# Patient Record
Sex: FEMALE | Race: WHITE | NOT HISPANIC OR LATINO | Employment: UNEMPLOYED | URBAN - METROPOLITAN AREA
[De-identification: names, ages, dates, MRNs, and addresses within clinical notes are randomized per-mention and may not be internally consistent; named-entity substitution may affect disease eponyms.]

---

## 2017-03-16 ENCOUNTER — OFFICE VISIT (OUTPATIENT)
Dept: FAMILY MEDICINE CLINIC | Facility: CLINIC | Age: 62
End: 2017-03-16

## 2017-03-16 ENCOUNTER — HOSPITAL ENCOUNTER (OUTPATIENT)
Dept: GENERAL RADIOLOGY | Facility: HOSPITAL | Age: 62
Discharge: HOME | End: 2017-03-16
Admitting: NURSE PRACTITIONER

## 2017-03-16 VITALS
RESPIRATION RATE: 16 BRPM | TEMPERATURE: 97.9 F | HEART RATE: 68 BPM | HEIGHT: 63 IN | SYSTOLIC BLOOD PRESSURE: 118 MMHG | WEIGHT: 163.2 LBS | BODY MASS INDEX: 28.92 KG/M2 | DIASTOLIC BLOOD PRESSURE: 75 MMHG

## 2017-03-16 DIAGNOSIS — M25.572 PAIN AND SWELLING OF LEFT ANKLE: Primary | ICD-10-CM

## 2017-03-16 DIAGNOSIS — Z87.81 HX OF FRACTURE OF ANKLE: ICD-10-CM

## 2017-03-16 DIAGNOSIS — M25.472 PAIN AND SWELLING OF LEFT ANKLE: Primary | ICD-10-CM

## 2017-03-16 PROCEDURE — 99213 OFFICE O/P EST LOW 20 MIN: CPT | Performed by: NURSE PRACTITIONER

## 2017-03-16 PROCEDURE — 73610 X-RAY EXAM OF ANKLE: CPT
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
## 2017-07-31 ENCOUNTER — OFFICE VISIT (OUTPATIENT)
Dept: FAMILY MEDICINE CLINIC | Facility: CLINIC | Age: 62
End: 2017-07-31

## 2017-07-31 VITALS
DIASTOLIC BLOOD PRESSURE: 80 MMHG | OXYGEN SATURATION: 97 % | SYSTOLIC BLOOD PRESSURE: 124 MMHG | HEART RATE: 72 BPM | RESPIRATION RATE: 14 BRPM | HEIGHT: 63 IN | WEIGHT: 146 LBS | BODY MASS INDEX: 25.87 KG/M2 | TEMPERATURE: 98 F

## 2017-07-31 DIAGNOSIS — S86.111A GASTROCNEMIUS MUSCLE TEAR, RIGHT, INITIAL ENCOUNTER: Primary | ICD-10-CM

## 2017-07-31 PROCEDURE — 99213 OFFICE O/P EST LOW 20 MIN: CPT | Performed by: FAMILY MEDICINE

## 2017-07-31 NOTE — PROGRESS NOTES
"  Chief Complaint   Patient presents with   • rt leg pain     pt had cramp this morning and went to get out of bed to walk it off and as she was walking pt joni something pop and now he leg is hurting pretty bad and nothing to rub out as  tried massaging it and no knot or anything. pt states it is not knee or ankle it calf of leg       Subjective     HPI    Felt cramp in the right calf and then walked it off and then another cramp and then got up and heard pop.  did not feel it.  + tender now and sore to touch  Pain with flexing and moving foot.   No h/o of this    Cramps are nearly every night      Past Medical History,Medications, Allergies, and social history was reviewed.    Review of Systems   Constitutional: Negative.    HENT: Negative.    Respiratory: Negative.    Cardiovascular: Negative.    Gastrointestinal: Negative.    Musculoskeletal: Positive for arthralgias and myalgias.   Psychiatric/Behavioral: Negative.        Objective     Vitals:    07/31/17 1419   BP: 124/80   Pulse: 72   Resp: 14   Temp: 98 °F (36.7 °C)   TempSrc: Temporal Artery    SpO2: 97%   Weight: 146 lb (66.2 kg)   Height: 62.75\" (159.4 cm)        Physical Exam   Constitutional: She is oriented to person, place, and time. She appears well-developed and well-nourished.   HENT:   Head: Normocephalic and atraumatic.   Right Ear: Hearing and external ear normal.   Left Ear: Hearing and external ear normal.   Mouth/Throat: Oropharynx is clear and moist.   Eyes: Conjunctivae and EOM are normal. Pupils are equal, round, and reactive to light.   Cardiovascular: Normal rate, regular rhythm and normal heart sounds.  Exam reveals no friction rub.    No murmur heard.  Pulmonary/Chest: Effort normal and breath sounds normal. No respiratory distress. She has no wheezes. She has no rales.   Musculoskeletal: She exhibits no edema.   ++ tender right medial calf, sl swelling, not hot, no bruising at this time, achilles intact, able to " dorsiflex and plantar flex foot.    Neurological: She is alert and oriented to person, place, and time.   Skin: Skin is warm.   Psychiatric: She has a normal mood and affect. Her behavior is normal.   Nursing note and vitals reviewed.        Assessment/Plan     Problem List Items Addressed This Visit     None      Visit Diagnoses     Gastrocnemius muscle tear, right, initial encounter    -  Primary    Mild           Follow up: No Follow-up on file.       DISCUSSION    Rec rest.   Ice, compression  Suspect mild tear    Start NSAID'S in 3-4 days but hold now due to potential bleeding.       Call if increased pain or swelling  She agrees    She was supposed to play in a golf tournament this week but suspect she will not be able to play and should rest this to prevent further injury        MEDICATIONS PRESCRIBED  Requested Prescriptions      No prescriptions requested or ordered in this encounter          Martell Davis MD

## 2017-11-09 ENCOUNTER — OFFICE VISIT (OUTPATIENT)
Dept: FAMILY MEDICINE CLINIC | Facility: CLINIC | Age: 62
End: 2017-11-09

## 2017-11-09 VITALS
DIASTOLIC BLOOD PRESSURE: 70 MMHG | RESPIRATION RATE: 14 BRPM | WEIGHT: 143.5 LBS | HEART RATE: 68 BPM | HEIGHT: 63 IN | BODY MASS INDEX: 25.43 KG/M2 | SYSTOLIC BLOOD PRESSURE: 114 MMHG | OXYGEN SATURATION: 98 % | TEMPERATURE: 97.3 F

## 2017-11-09 DIAGNOSIS — E78.1 PURE HYPERGLYCERIDEMIA: Primary | ICD-10-CM

## 2017-11-09 DIAGNOSIS — L40.9 PSORIASIS: ICD-10-CM

## 2017-11-09 LAB
ALBUMIN SERPL-MCNC: 4.4 G/DL (ref 3.2–4.8)
ALBUMIN/GLOB SERPL: 1.6 G/DL (ref 1.5–2.5)
ALP SERPL-CCNC: 98 U/L (ref 25–100)
ALT SERPL-CCNC: 22 U/L (ref 7–40)
AST SERPL-CCNC: 23 U/L (ref 0–33)
BASOPHILS # BLD AUTO: 0.04 10*3/MM3 (ref 0–0.2)
BASOPHILS NFR BLD AUTO: 0.5 % (ref 0–1)
BILIRUB SERPL-MCNC: 0.5 MG/DL (ref 0.3–1.2)
BUN SERPL-MCNC: 15 MG/DL (ref 9–23)
BUN/CREAT SERPL: 18.8 (ref 7–25)
CALCIUM SERPL-MCNC: 9.5 MG/DL (ref 8.7–10.4)
CHLORIDE SERPL-SCNC: 107 MMOL/L (ref 99–109)
CHOLEST SERPL-MCNC: 225 MG/DL (ref 0–200)
CHOLEST/HDLC SERPL: 4.09 {RATIO}
CO2 SERPL-SCNC: 30 MMOL/L (ref 20–31)
CREAT SERPL-MCNC: 0.8 MG/DL (ref 0.6–1.3)
EOSINOPHIL # BLD AUTO: 0.16 10*3/MM3 (ref 0–0.3)
EOSINOPHIL NFR BLD AUTO: 2 % (ref 0–3)
ERYTHROCYTE [DISTWIDTH] IN BLOOD BY AUTOMATED COUNT: 14.4 % (ref 11.3–14.5)
GFR SERPLBLD CREATININE-BSD FMLA CKD-EPI: 72 ML/MIN/1.73
GFR SERPLBLD CREATININE-BSD FMLA CKD-EPI: 88 ML/MIN/1.73
GLOBULIN SER CALC-MCNC: 2.8 GM/DL
GLUCOSE SERPL-MCNC: 88 MG/DL (ref 70–100)
HCT VFR BLD AUTO: 45.2 % (ref 34.5–44)
HDLC SERPL-MCNC: 55 MG/DL (ref 40–60)
HGB BLD-MCNC: 14.4 G/DL (ref 11.5–15.5)
IMM GRANULOCYTES # BLD: 0.03 10*3/MM3 (ref 0–0.03)
IMM GRANULOCYTES NFR BLD: 0.4 % (ref 0–0.6)
LDLC SERPL CALC-MCNC: 140 MG/DL (ref 0–100)
LYMPHOCYTES # BLD AUTO: 1.64 10*3/MM3 (ref 0.6–4.8)
LYMPHOCYTES NFR BLD AUTO: 20.5 % (ref 24–44)
MCH RBC QN AUTO: 27.9 PG (ref 27–31)
MCHC RBC AUTO-ENTMCNC: 31.9 G/DL (ref 32–36)
MCV RBC AUTO: 87.4 FL (ref 80–99)
MONOCYTES # BLD AUTO: 1.02 10*3/MM3 (ref 0–1)
MONOCYTES NFR BLD AUTO: 12.7 % (ref 0–12)
NEUTROPHILS # BLD AUTO: 5.12 10*3/MM3 (ref 1.5–8.3)
NEUTROPHILS NFR BLD AUTO: 63.9 % (ref 41–71)
PLATELET # BLD AUTO: 252 10*3/MM3 (ref 150–450)
POTASSIUM SERPL-SCNC: 5.8 MMOL/L (ref 3.5–5.5)
PROT SERPL-MCNC: 7.2 G/DL (ref 5.7–8.2)
RBC # BLD AUTO: 5.17 10*6/MM3 (ref 3.89–5.14)
SODIUM SERPL-SCNC: 141 MMOL/L (ref 132–146)
TRIGL SERPL-MCNC: 150 MG/DL (ref 0–150)
VLDLC SERPL CALC-MCNC: 30 MG/DL
WBC # BLD AUTO: 8.01 10*3/MM3 (ref 3.5–10.8)

## 2017-11-09 PROCEDURE — 99213 OFFICE O/P EST LOW 20 MIN: CPT | Performed by: FAMILY MEDICINE
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
## 2017-11-10 ENCOUNTER — TELEPHONE (OUTPATIENT)
Dept: FAMILY MEDICINE CLINIC | Facility: CLINIC | Age: 62
End: 2017-11-10

## 2017-11-10 DIAGNOSIS — E87.5 HYPERKALEMIA: Primary | ICD-10-CM

## 2017-11-10 NOTE — TELEPHONE ENCOUNTER
See lab result note.  I failed to mention that her cholesterol was also elevated and higher than last check.  Need to continue to work on diet changes, decreasing fats and cholesterol in diet and recheck the cholesterol panel in 6 months.

## 2017-11-15 ENCOUNTER — RESULTS ENCOUNTER (OUTPATIENT)
Dept: FAMILY MEDICINE CLINIC | Facility: CLINIC | Age: 62
End: 2017-11-15

## 2017-11-15 DIAGNOSIS — E87.5 HYPERKALEMIA: ICD-10-CM

## 2017-11-17 LAB — POTASSIUM SERPL-SCNC: 5.3 MMOL/L (ref 3.5–5.5)

## 2018-04-09 ENCOUNTER — HOSPITAL ENCOUNTER (OUTPATIENT)
Dept: GENERAL RADIOLOGY | Facility: HOSPITAL | Age: 63
Discharge: HOME | End: 2018-04-09
Admitting: FAMILY MEDICINE

## 2018-04-09 ENCOUNTER — OFFICE VISIT (OUTPATIENT)
Dept: FAMILY MEDICINE CLINIC | Facility: CLINIC | Age: 63
End: 2018-04-09

## 2018-04-09 VITALS
HEIGHT: 63 IN | SYSTOLIC BLOOD PRESSURE: 120 MMHG | TEMPERATURE: 97.9 F | WEIGHT: 154 LBS | DIASTOLIC BLOOD PRESSURE: 80 MMHG | BODY MASS INDEX: 27.29 KG/M2 | RESPIRATION RATE: 16 BRPM | HEART RATE: 72 BPM

## 2018-04-09 DIAGNOSIS — Z87.81 HISTORY OF ANKLE FRACTURE: ICD-10-CM

## 2018-04-09 DIAGNOSIS — E78.1 PURE HYPERGLYCERIDEMIA: ICD-10-CM

## 2018-04-09 DIAGNOSIS — G89.29 CHRONIC PAIN OF LEFT ANKLE: Primary | ICD-10-CM

## 2018-04-09 DIAGNOSIS — R93.89 ABNORMAL CHEST X-RAY: ICD-10-CM

## 2018-04-09 DIAGNOSIS — M25.572 CHRONIC PAIN OF LEFT ANKLE: Primary | ICD-10-CM

## 2018-04-09 DIAGNOSIS — Z79.899 HIGH RISK MEDICATION USE: ICD-10-CM

## 2018-04-09 LAB
ALBUMIN SERPL-MCNC: 4.4 G/DL (ref 3.2–4.8)
ALBUMIN/GLOB SERPL: 1.5 G/DL (ref 1.5–2.5)
ALP SERPL-CCNC: 90 U/L (ref 25–100)
ALT SERPL-CCNC: 42 U/L (ref 7–40)
AST SERPL-CCNC: 33 U/L (ref 0–33)
BILIRUB SERPL-MCNC: 0.6 MG/DL (ref 0.3–1.2)
BUN SERPL-MCNC: 15 MG/DL (ref 9–23)
BUN/CREAT SERPL: 18.8 (ref 7–25)
CALCIUM SERPL-MCNC: 9.6 MG/DL (ref 8.7–10.4)
CHLORIDE SERPL-SCNC: 101 MMOL/L (ref 99–109)
CHOLEST SERPL-MCNC: 257 MG/DL (ref 0–200)
CHOLEST/HDLC SERPL: 4.67 {RATIO}
CO2 SERPL-SCNC: 31 MMOL/L (ref 20–31)
CREAT SERPL-MCNC: 0.8 MG/DL (ref 0.6–1.3)
GFR SERPLBLD CREATININE-BSD FMLA CKD-EPI: 72 ML/MIN/1.73
GFR SERPLBLD CREATININE-BSD FMLA CKD-EPI: 88 ML/MIN/1.73
GLOBULIN SER CALC-MCNC: 2.9 GM/DL
GLUCOSE SERPL-MCNC: 91 MG/DL (ref 70–100)
HDLC SERPL-MCNC: 55 MG/DL (ref 40–60)
LDLC SERPL CALC-MCNC: 136 MG/DL (ref 0–100)
POTASSIUM SERPL-SCNC: 5.3 MMOL/L (ref 3.5–5.5)
PROT SERPL-MCNC: 7.3 G/DL (ref 5.7–8.2)
SODIUM SERPL-SCNC: 141 MMOL/L (ref 132–146)
TRIGL SERPL-MCNC: 331 MG/DL (ref 0–150)
VLDLC SERPL CALC-MCNC: 66.2 MG/DL

## 2018-04-09 PROCEDURE — 99213 OFFICE O/P EST LOW 20 MIN: CPT | Performed by: FAMILY MEDICINE

## 2018-04-09 PROCEDURE — 71046 X-RAY EXAM CHEST 2 VIEWS: CPT

## 2018-04-09 PROCEDURE — 73610 X-RAY EXAM OF ANKLE: CPT

## 2018-04-09 RX ORDER — ADALIMUMAB 40MG/0.8ML
KIT SUBCUTANEOUS
COMMUNITY
Start: 2018-03-22 | End: 2019-04-10

## 2018-04-09 RX ORDER — BETAMETHASONE DIPROPIONATE 0.5 MG/G
LOTION TOPICAL
COMMUNITY
Start: 2018-03-28 | End: 2019-04-10
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
## 2018-04-10 DIAGNOSIS — Z87.81 HISTORY OF FRACTURE OF LEFT ANKLE: ICD-10-CM

## 2018-04-10 DIAGNOSIS — M25.572 CHRONIC PAIN OF LEFT ANKLE: Primary | ICD-10-CM

## 2018-04-10 DIAGNOSIS — G89.29 CHRONIC PAIN OF LEFT ANKLE: Primary | ICD-10-CM

## 2018-04-10 NOTE — PROGRESS NOTES
Spoke with pt and went over results/instructions. Verbalized understanding. Result faxed to Laura Smith at [facility] (P [phone]   F [phone]) per pt request

## 2018-04-13 ENCOUNTER — HOSPITAL ENCOUNTER (OUTPATIENT)
Dept: MRI IMAGING | Facility: HOSPITAL | Age: 63
Discharge: HOME | End: 2018-04-13
Admitting: FAMILY MEDICINE

## 2018-04-13 DIAGNOSIS — M25.572 CHRONIC PAIN OF LEFT ANKLE: ICD-10-CM

## 2018-04-13 DIAGNOSIS — Z87.81 HISTORY OF FRACTURE OF LEFT ANKLE: ICD-10-CM

## 2018-04-13 DIAGNOSIS — G89.29 CHRONIC PAIN OF LEFT ANKLE: ICD-10-CM

## 2018-04-13 PROCEDURE — 73721 MRI JNT OF LWR EXTRE W/O DYE: CPT

## 2018-07-31 ENCOUNTER — OFFICE VISIT (OUTPATIENT)
Dept: FAMILY MEDICINE CLINIC | Facility: CLINIC | Age: 63
End: 2018-07-31

## 2018-07-31 VITALS
SYSTOLIC BLOOD PRESSURE: 130 MMHG | HEIGHT: 63 IN | BODY MASS INDEX: 28.88 KG/M2 | DIASTOLIC BLOOD PRESSURE: 90 MMHG | WEIGHT: 163 LBS | HEART RATE: 64 BPM | TEMPERATURE: 98.1 F | RESPIRATION RATE: 20 BRPM

## 2018-07-31 DIAGNOSIS — L03.116 LEFT LEG CELLULITIS: ICD-10-CM

## 2018-07-31 DIAGNOSIS — S80.812A ABRASION OF LEFT LOWER EXTREMITY, INITIAL ENCOUNTER: ICD-10-CM

## 2018-07-31 DIAGNOSIS — M79.605 LEFT LEG PAIN: Primary | ICD-10-CM

## 2018-07-31 DIAGNOSIS — M79.89 LEFT LEG SWELLING: ICD-10-CM

## 2018-07-31 PROCEDURE — 99214 OFFICE O/P EST MOD 30 MIN: CPT | Performed by: FAMILY MEDICINE

## 2018-07-31 RX ORDER — CEPHALEXIN 500 MG/1
1000 CAPSULE ORAL 2 TIMES DAILY
Qty: 40 CAPSULE | Refills: 0 | Status: SHIPPED | OUTPATIENT
Start: 2018-07-31 | End: 2019-04-10

## 2018-07-31 NOTE — PROGRESS NOTES
Assessment/Plan       Problems Addressed this Visit     None      Visit Diagnoses     Left leg pain    -  Primary    Relevant Orders    Duplex Venous Lower Extremity - Left CAR    Left leg swelling        Relevant Orders    Duplex Venous Lower Extremity - Left CAR    Left leg cellulitis        Relevant Medications    cephalexin (KEFLEX) 500 MG capsule    Other Relevant Orders    Ambulatory Referral to Wound Clinic    Abrasion of left lower extremity, initial encounter        Relevant Orders    Ambulatory Referral to Wound Clinic            Follow up: Return if symptoms worsen or fail to improve, for follow up depends on review of labs and testing.     DISCUSSION  Due to persistent pain and swelling of the left leg, recommend check ultrasound to evaluate for possible blood clot.  She agrees.    The ultrasound was negative for DVT.  We will start Keflex for the persistent cellulitis.  She started completed doxycycline but it is not improving with that.  She is not having any active fever or chills.    Due to persistent wound of the left posterior calf, recommend refer to wound care.  She agrees.  The Achilles tendon does not appear to be involved and she has good flexion and extension of the Achilles tendon and the Achilles tendon is nontender at the insertion site.      MEDICATIONS PRESCRIBED  Requested Prescriptions     Signed Prescriptions Disp Refills   • cephalexin (KEFLEX) 500 MG capsule 40 capsule 0     Sig: Take 2 capsules by mouth 2 (Two) Times a Day.              -------------------------------------------    Subjective     Chief Complaint   Patient presents with   • Cellulitis     LLE. She went to the [employer] [facility] while out of town. She is here to f/u. She has had it x3 wks         History of Present Illness    Left leg injury    [place] and hurt the back of the right leg, scraped leg, tore skin, + swollen then and went to [facility] x 2 in [place]. Gave  "antibiotics    Did culture at 2nd visit and was negative     + swelling in the left leg and calf    Had x-rays and was ok and no fracture    On Humira for psoriasis  She let them know that she had this infection    No fever.  No chills.    Wound is persisting on the left lower extremity.    Has completed doxycycline but still having issues with pain and swelling.      History   Smoking Status   • Never Smoker   Smokeless Tobacco   • Never Used        Past Medical History,Medications, Allergies, and social history was reviewed.      Review of Systems   Constitutional: Negative.    HENT: Negative.    Respiratory: Negative.    Cardiovascular: Negative.    Gastrointestinal: Negative.    Musculoskeletal:        Leg pain   Psychiatric/Behavioral: Negative.        Objective     Vitals:    07/31/18 1217   BP: 130/90   Pulse: 64   Resp: 20   Temp: 98.1 °F (36.7 °C)   Weight: 73.9 kg (163 lb)   Height: 160 cm (63\")          Physical Exam   Constitutional: She is oriented to person, place, and time. She appears well-developed and well-nourished.   HENT:   Head: Normocephalic and atraumatic.   Right Ear: Hearing normal.   Left Ear: Hearing normal.   Eyes: Pupils are equal, round, and reactive to light. Conjunctivae and EOM are normal.   Cardiovascular: Normal rate, regular rhythm and normal heart sounds.  Exam reveals no friction rub.    No murmur heard.  Pulmonary/Chest: Effort normal and breath sounds normal. No respiratory distress. She has no wheezes. She has no rales.   Musculoskeletal:   Left posterior lower leg reveals a oval scab/ulceration with surrounding erythema and tenderness.  Good flexion and extension at the Achilles.  Mild tenderness at the proximal Achilles but not distally at insertion site.  There is tenderness of the calf muscle and Homans sign is positive.  Although difficult to say from a clot or just from the injury.   Neurological: She is alert and oriented to person, place, and time.   Skin: Skin is " warm. Capillary refill takes less than 2 seconds.   Psychiatric: She has a normal mood and affect.   Nursing note and vitals reviewed.                Martell Davis MD

## 2019-04-10 ENCOUNTER — OFFICE VISIT (OUTPATIENT)
Dept: FAMILY MEDICINE CLINIC | Facility: CLINIC | Age: 64
End: 2019-04-10

## 2019-04-10 VITALS
HEIGHT: 63 IN | HEART RATE: 68 BPM | BODY MASS INDEX: 28.7 KG/M2 | RESPIRATION RATE: 16 BRPM | SYSTOLIC BLOOD PRESSURE: 135 MMHG | TEMPERATURE: 98 F | DIASTOLIC BLOOD PRESSURE: 72 MMHG | WEIGHT: 162 LBS

## 2019-04-10 DIAGNOSIS — R42 VERTIGO: ICD-10-CM

## 2019-04-10 DIAGNOSIS — Z87.828 HISTORY OF TRAUMATIC HEAD INJURY: ICD-10-CM

## 2019-04-10 DIAGNOSIS — E78.1 HYPERTRIGLYCERIDEMIA: ICD-10-CM

## 2019-04-10 DIAGNOSIS — H55.00 NYSTAGMUS: Primary | ICD-10-CM

## 2019-04-10 PROCEDURE — 99214 OFFICE O/P EST MOD 30 MIN: CPT | Performed by: NURSE PRACTITIONER
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
## 2019-04-11 DIAGNOSIS — E87.5 SERUM POTASSIUM ELEVATED: Primary | ICD-10-CM

## 2019-04-11 LAB
ALBUMIN SERPL-MCNC: 4.6 G/DL (ref 3.5–5.2)
ALBUMIN/GLOB SERPL: 1.6 G/DL
ALP SERPL-CCNC: 86 U/L (ref 39–117)
ALT SERPL-CCNC: 31 U/L (ref 1–33)
AST SERPL-CCNC: 33 U/L (ref 1–32)
BASOPHILS # BLD AUTO: 0.12 10*3/MM3 (ref 0–0.2)
BASOPHILS NFR BLD AUTO: 1.3 % (ref 0–1.5)
BILIRUB SERPL-MCNC: 0.2 MG/DL (ref 0.2–1.2)
BUN SERPL-MCNC: 8 MG/DL (ref 8–23)
BUN/CREAT SERPL: 9.5 (ref 7–25)
CALCIUM SERPL-MCNC: 9.5 MG/DL (ref 8.6–10.5)
CHLORIDE SERPL-SCNC: 107 MMOL/L (ref 98–107)
CHOLEST SERPL-MCNC: 199 MG/DL (ref 0–200)
CO2 SERPL-SCNC: 26.7 MMOL/L (ref 22–29)
CREAT SERPL-MCNC: 0.84 MG/DL (ref 0.57–1)
EOSINOPHIL # BLD AUTO: 0.21 10*3/MM3 (ref 0–0.4)
EOSINOPHIL NFR BLD AUTO: 2.2 % (ref 0.3–6.2)
ERYTHROCYTE [DISTWIDTH] IN BLOOD BY AUTOMATED COUNT: 14.4 % (ref 12.3–15.4)
GLOBULIN SER CALC-MCNC: 2.9 GM/DL
GLUCOSE SERPL-MCNC: 100 MG/DL (ref 65–99)
HCT VFR BLD AUTO: 46.9 % (ref 34–46.6)
HDLC SERPL-MCNC: 51 MG/DL (ref 40–60)
HGB BLD-MCNC: 14 G/DL (ref 12–15.9)
IMM GRANULOCYTES # BLD AUTO: 0.06 10*3/MM3 (ref 0–0.05)
IMM GRANULOCYTES NFR BLD AUTO: 0.6 % (ref 0–0.5)
LDLC SERPL CALC-MCNC: 131 MG/DL (ref 0–100)
LYMPHOCYTES # BLD AUTO: 2 10*3/MM3 (ref 0.7–3.1)
LYMPHOCYTES NFR BLD AUTO: 21.4 % (ref 19.6–45.3)
MCH RBC QN AUTO: 27.3 PG (ref 26.6–33)
MCHC RBC AUTO-ENTMCNC: 29.9 G/DL (ref 31.5–35.7)
MCV RBC AUTO: 91.6 FL (ref 79–97)
MONOCYTES # BLD AUTO: 1.04 10*3/MM3 (ref 0.1–0.9)
MONOCYTES NFR BLD AUTO: 11.1 % (ref 5–12)
NEUTROPHILS # BLD AUTO: 5.92 10*3/MM3 (ref 1.4–7)
NEUTROPHILS NFR BLD AUTO: 63.4 % (ref 42.7–76)
NRBC BLD AUTO-RTO: 0 /100 WBC (ref 0–0)
PLATELET # BLD AUTO: 288 10*3/MM3 (ref 140–450)
POTASSIUM SERPL-SCNC: 5.5 MMOL/L (ref 3.5–5.2)
PROT SERPL-MCNC: 7.5 G/DL (ref 6–8.5)
RBC # BLD AUTO: 5.12 10*6/MM3 (ref 3.77–5.28)
SODIUM SERPL-SCNC: 142 MMOL/L (ref 136–145)
TRIGL SERPL-MCNC: 83 MG/DL (ref 0–150)
VLDLC SERPL CALC-MCNC: 16.6 MG/DL
WBC # BLD AUTO: 9.35 10*3/MM3 (ref 3.4–10.8)

## 2019-04-12 ENCOUNTER — TELEPHONE (OUTPATIENT)
Dept: FAMILY MEDICINE CLINIC | Facility: CLINIC | Age: 64
End: 2019-04-12

## 2019-04-12 NOTE — TELEPHONE ENCOUNTER
----- Message from Yajaira Pabol sent at 4/12/2019  8:30 AM EDT -----  Contact: MARIE  PATIENT SAYS THAT NONE OF HER SUPPLEMENTS OR MEDICATIONS HAVE ANY POTASSIUM IN THEM. WHAT SHOULD SHE DO?  [phone]

## 2019-04-12 NOTE — TELEPHONE ENCOUNTER
Please call.  Recommend repeat the level as recommended by any.  Sometimes the red blood cells can break during transport for the blood test which artificially elevates the potassium level and there may not be an issue at all.

## 2019-04-16 ENCOUNTER — RESULTS ENCOUNTER (OUTPATIENT)
Dept: FAMILY MEDICINE CLINIC | Facility: CLINIC | Age: 64
End: 2019-04-16

## 2019-04-16 DIAGNOSIS — E87.5 SERUM POTASSIUM ELEVATED: ICD-10-CM

## 2019-04-19 LAB — POTASSIUM SERPL-SCNC: 4.9 MMOL/L (ref 3.5–5.2)

## 2019-04-30 ENCOUNTER — OFFICE VISIT (OUTPATIENT)
Dept: FAMILY MEDICINE CLINIC | Facility: CLINIC | Age: 64
End: 2019-04-30

## 2019-04-30 VITALS
BODY MASS INDEX: 29.23 KG/M2 | DIASTOLIC BLOOD PRESSURE: 78 MMHG | TEMPERATURE: 97.6 F | HEART RATE: 72 BPM | SYSTOLIC BLOOD PRESSURE: 118 MMHG | HEIGHT: 63 IN | WEIGHT: 165 LBS | RESPIRATION RATE: 18 BRPM

## 2019-04-30 DIAGNOSIS — Z00.00 WELL ADULT EXAM: Primary | ICD-10-CM

## 2019-04-30 PROCEDURE — 99396 PREV VISIT EST AGE 40-64: CPT | Performed by: FAMILY MEDICINE

## 2019-04-30 RX ORDER — ALBUTEROL SULFATE 90 UG/1
AEROSOL, METERED RESPIRATORY (INHALATION)
COMMUNITY
Start: 2019-04-16

## 2019-04-30 RX ORDER — AZELASTINE HCL 205.5 UG/1
SPRAY NASAL
COMMUNITY
Start: 2019-04-13 | End: 2021-09-21 | Stop reason: SDUPTHER
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
## 2019-08-22 ENCOUNTER — OFFICE VISIT (OUTPATIENT)
Dept: FAMILY MEDICINE CLINIC | Facility: CLINIC | Age: 64
End: 2019-08-22

## 2019-08-22 VITALS
WEIGHT: 144 LBS | TEMPERATURE: 97.7 F | DIASTOLIC BLOOD PRESSURE: 78 MMHG | RESPIRATION RATE: 16 BRPM | BODY MASS INDEX: 25.51 KG/M2 | HEART RATE: 68 BPM | SYSTOLIC BLOOD PRESSURE: 112 MMHG

## 2019-08-22 DIAGNOSIS — J20.8 ACUTE BRONCHITIS DUE TO OTHER SPECIFIED ORGANISMS: Primary | ICD-10-CM

## 2019-08-22 DIAGNOSIS — R06.2 WHEEZING: ICD-10-CM

## 2019-08-22 PROCEDURE — 99213 OFFICE O/P EST LOW 20 MIN: CPT | Performed by: NURSE PRACTITIONER

## 2019-08-22 RX ORDER — AMOXICILLIN AND CLAVULANATE POTASSIUM 875; 125 MG/1; MG/1
1 TABLET, FILM COATED ORAL 2 TIMES DAILY
Qty: 20 TABLET | Refills: 0 | Status: SHIPPED | OUTPATIENT
Start: 2019-08-22 | End: 2020-01-14

## 2019-08-22 NOTE — PROGRESS NOTES
Subjective   Heather Olmstead is a 64 y.o. female.     History of Present Illness   Cough and drainage and facial pain pressure in ears  Wheezing and productive cough with yellow sputum  On Cosentyx so her immune system is not as strong now, she thinks she needs some antibiotics,   No fever or chills, no dizziness   Taking OTC and allergy meds with no improvement    The following portions of the patient's history were reviewed and updated as appropriate: allergies, current medications, past family history, past medical history, past social history, past surgical history and problem list.    Review of Systems   Constitutional: Negative for activity change, chills and fever.   HENT: Positive for congestion, ear pain, postnasal drip, rhinorrhea and sinus pressure. Negative for sneezing, sore throat and swollen glands.    Eyes: Negative.    Respiratory: Positive for cough, chest tightness and wheezing. Negative for shortness of breath.    Cardiovascular: Negative.    Musculoskeletal: Negative.    Skin: Negative.    Neurological: Negative.    Hematological: Negative.    Psychiatric/Behavioral: Positive for sleep disturbance (due to cough').       Objective   Physical Exam   Constitutional: She is oriented to person, place, and time. She appears well-developed and well-nourished. She has a sickly appearance.   HENT:   Head: Normocephalic and atraumatic.   Right Ear: Tympanic membrane, external ear and ear canal normal. Tympanic membrane is not erythematous.   Left Ear: Tympanic membrane, external ear and ear canal normal. Tympanic membrane is not erythematous.   Nose: Mucosal edema and rhinorrhea present. Right sinus exhibits no maxillary sinus tenderness and no frontal sinus tenderness. Left sinus exhibits no maxillary sinus tenderness and no frontal sinus tenderness.   Mouth/Throat: Uvula is midline, oropharynx is clear and moist and mucous membranes are normal. No oropharyngeal exudate, posterior oropharyngeal edema or  posterior oropharyngeal erythema.   Eyes: Lids are normal.   Neck: Normal range of motion. Neck supple. No thyroid mass and no thyromegaly present.   Cardiovascular: Normal rate, regular rhythm, S1 normal, S2 normal and normal heart sounds. Exam reveals no friction rub.   No murmur heard.  Pulmonary/Chest: Effort normal and breath sounds normal. No stridor. No respiratory distress. She has no wheezes. She has no rales.   Abdominal: Soft. Normal appearance and bowel sounds are normal. There is no tenderness.   Musculoskeletal: Normal range of motion.   Lymphadenopathy:        Head (right side): No tonsillar, no preauricular, no posterior auricular and no occipital adenopathy present.        Head (left side): No tonsillar, no preauricular, no posterior auricular and no occipital adenopathy present.     She has no cervical adenopathy.   Neurological: She is alert and oriented to person, place, and time.   Skin: Skin is warm, dry and intact. Capillary refill takes less than 2 seconds. No rash noted. She is not diaphoretic. No cyanosis. No pallor.   Psychiatric: She has a normal mood and affect. Her behavior is normal. Judgment and thought content normal. Cognition and memory are normal.   Nursing note and vitals reviewed.        Assessment/Plan   Heather was seen today for cough.    Diagnoses and all orders for this visit:    Acute bronchitis due to other specified organisms  -     amoxicillin-clavulanate (AUGMENTIN) 875-125 MG per tablet; Take 1 tablet by mouth 2 (Two) Times a Day.    Wheezing      Will treat pt with abx take until gone  Follow up if not improving, will consider steroids if wheezing persists pt agrees.

## 2020-01-14 ENCOUNTER — OFFICE VISIT (OUTPATIENT)
Dept: FAMILY MEDICINE CLINIC | Facility: CLINIC | Age: 65
End: 2020-01-14

## 2020-01-14 VITALS
HEART RATE: 80 BPM | SYSTOLIC BLOOD PRESSURE: 116 MMHG | HEIGHT: 63 IN | WEIGHT: 144.6 LBS | DIASTOLIC BLOOD PRESSURE: 72 MMHG | TEMPERATURE: 97.7 F | BODY MASS INDEX: 25.62 KG/M2 | RESPIRATION RATE: 18 BRPM

## 2020-01-14 DIAGNOSIS — Z86.69 HISTORY OF FREQUENT EAR INFECTIONS: ICD-10-CM

## 2020-01-14 DIAGNOSIS — J30.89 ENVIRONMENTAL AND SEASONAL ALLERGIES: Primary | ICD-10-CM

## 2020-01-14 DIAGNOSIS — Z51.6 ALLERGY DESENSITIZATION THERAPY: ICD-10-CM

## 2020-01-14 DIAGNOSIS — J32.8 OTHER CHRONIC SINUSITIS: ICD-10-CM

## 2020-01-14 PROCEDURE — 99213 OFFICE O/P EST LOW 20 MIN: CPT | Performed by: NURSE PRACTITIONER

## 2020-01-14 RX ORDER — FLUOCINONIDE TOPICAL SOLUTION USP, 0.05% 0.5 MG/ML
SOLUTION TOPICAL
COMMUNITY
Start: 2019-12-05 | End: 2021-05-26

## 2020-01-14 RX ORDER — CONJUGATED ESTROGENS 0.62 MG/G
CREAM VAGINAL
COMMUNITY
Start: 2019-11-15 | End: 2020-10-21 | Stop reason: SDUPTHER

## 2020-01-14 NOTE — PROGRESS NOTES
Subjective   Heather Olmstead is a 64 y.o. female.     History of Present Illness   Ears blocked worried that she might have ear wax built up or if she has just problem with congestion  Has long time issue of allergies/sinus and ear infections, issues with environmental and food allergies, takes allergy injections  Has had a cold and taking Holistic supplements by her Holistic practitioner  No fever or chills, no drainage or cough   Clear nasal discharge  Taking immunosupressive medication for Psoriasis   Hx of chronic ear infections, tubes in ears by [facility]  Would like a referral to different ENT allergist so she does not have to continue to go to [place] for her allergy injections  The following portions of the patient's history were reviewed and updated as appropriate: allergies, current medications, past family history, past medical history, past social history, past surgical history and problem list.    Review of Systems   Constitutional: Negative for activity change, chills and fever.   HENT: Positive for congestion, ear pain, hearing loss (worse on left), rhinorrhea (clear) and sinus pressure. Negative for postnasal drip, sneezing, sore throat and swollen glands.    Eyes: Negative for redness.   Respiratory: Negative.  Negative for cough, chest tightness, shortness of breath and wheezing.    Cardiovascular: Negative.  Negative for chest pain.   Allergic/Immunologic: Positive for environmental allergies, food allergies and immunocompromised state.   Neurological: Negative for dizziness and headache.   Hematological: Negative.    Psychiatric/Behavioral: Negative.        Objective   Physical Exam   Constitutional: She is oriented to person, place, and time. She appears well-developed and well-nourished. She has a sickly appearance. No distress.   HENT:   Head: Normocephalic and atraumatic.   Right Ear: External ear and ear canal normal. No mastoid tenderness. Tympanic membrane is scarred. Tympanic membrane is  not erythematous.   Left Ear: External ear and ear canal normal. No mastoid tenderness. Tympanic membrane is scarred. Tympanic membrane is not erythematous. Decreased hearing is noted.   Nose: Mucosal edema and rhinorrhea present. No congestion. Right sinus exhibits no maxillary sinus tenderness and no frontal sinus tenderness. Left sinus exhibits no maxillary sinus tenderness and no frontal sinus tenderness.   Mouth/Throat: Uvula is midline, oropharynx is clear and moist and mucous membranes are normal. No oropharyngeal exudate, posterior oropharyngeal edema or posterior oropharyngeal erythema.   Eyes: Lids are normal.   Neck: Neck supple.   Cardiovascular: Normal rate, regular rhythm, S1 normal, S2 normal and normal heart sounds.   Pulmonary/Chest: Effort normal and breath sounds normal.   Abdominal: Soft. Normal appearance and bowel sounds are normal. There is no tenderness.   Lymphadenopathy:        Head (right side): No tonsillar, no preauricular, no posterior auricular and no occipital adenopathy present.        Head (left side): No tonsillar, no preauricular, no posterior auricular and no occipital adenopathy present.     She has no cervical adenopathy.   Neurological: She is alert and oriented to person, place, and time.   Skin: Skin is warm, dry and intact. Capillary refill takes less than 2 seconds. No rash noted. She is not diaphoretic. No cyanosis. No pallor.   Psychiatric: She has a normal mood and affect. Her speech is normal and behavior is normal. Judgment and thought content normal. Cognition and memory are normal.   Nursing note and vitals reviewed.        Assessment/Plan   Heather was seen today for ears blocked and sinus problem.    Diagnoses and all orders for this visit:    Environmental and seasonal allergies  -     Ambulatory Referral to ENT (Otolaryngology)    History of frequent ear infections  -     Ambulatory Referral to ENT (Otolaryngology)    Other chronic sinusitis  -     Ambulatory  Referral to ENT (Otolaryngology)    Allergy desensitization therapy  -     Ambulatory Referral to ENT (Otolaryngology)      Pt does not need any antibiotics, no sign of infection, continue current meds   Requesting a referral to different ENT - order placed       Answers for HPI/ROS submitted by the patient on 1/13/2020   How long have you been having these symptoms?: 1-4 weeks ago

## 2020-10-21 ENCOUNTER — OFFICE VISIT (OUTPATIENT)
Dept: OBSTETRICS AND GYNECOLOGY | Facility: CLINIC | Age: 65
End: 2020-10-21

## 2020-10-21 VITALS
BODY MASS INDEX: 27.82 KG/M2 | SYSTOLIC BLOOD PRESSURE: 128 MMHG | WEIGHT: 157 LBS | HEIGHT: 63 IN | DIASTOLIC BLOOD PRESSURE: 80 MMHG

## 2020-10-21 DIAGNOSIS — Z78.0 POSTMENOPAUSAL STATE: ICD-10-CM

## 2020-10-21 DIAGNOSIS — Z13.820 OSTEOPOROSIS SCREENING: ICD-10-CM

## 2020-10-21 DIAGNOSIS — Z01.419 WOMEN'S ANNUAL ROUTINE GYNECOLOGICAL EXAMINATION: Primary | ICD-10-CM

## 2020-10-21 DIAGNOSIS — N95.2 VAGINAL ATROPHY: ICD-10-CM

## 2020-10-21 PROCEDURE — G0101 CA SCREEN;PELVIC/BREAST EXAM: HCPCS | Performed by: OBSTETRICS & GYNECOLOGY

## 2020-10-21 RX ORDER — CONJUGATED ESTROGENS 0.62 MG/G
CREAM VAGINAL 2 TIMES WEEKLY
Qty: 1 EACH | Refills: 5 | Status: SHIPPED | OUTPATIENT
Start: 2020-10-22 | End: 2021-10-27

## 2020-10-21 NOTE — PROGRESS NOTES
GYN Annual Exam     CC - Here for annual exam.     Subjective   HPI  Heather Olmstead is a 65 y.o. female, , who presents for annual well woman exam.  She is postmenopausal. Her last LMP was No LMP recorded. Patient is postmenopausal..     Partner Status: Marital Status: .  New Partners since last visit: no Pt would like to know if there is an alternative to Premarin d/t cost.  Pt c/o vaginal dryness.       Last mammogram: Pt has Thermograpy; last was  and showed stable fibrocystic changes.   Last Completed Mammogram       Status Date      MAMMOGRAM No completions recorded        Last colonoscopy: ;  Benign polyps removed per pt   Last Completed Colonoscopy       Status Date      COLONOSCOPY Done 2016      Patient has more history with this topic...        Last DEXA: 2019; osteoporosis  Last Pap : 2019  Last Completed Pap Smear     Patient has no health maintenance due at this time        History of abnormal Pap smear: no    Additional OB/GYN History   Family history of uterine, colon, breast, or ovarian cancer: no  Performs monthly Self-Breast Exam: no  Parental Hip Fracture: none   Exercises Regularly: yes - .  Feelings of Anxiety or Depression: no    Tobacco Usage?: No   OB History        2    Para   2    Term   2       0    AB   0    Living   0       SAB   0    TAB   0    Ectopic   0    Molar   0    Multiple   0    Live Births   2                Health Maintenance   Topic Date Due   • TDAP/TD VACCINES (1 - Tdap) 08/10/1973   • HEPATITIS C SCREENING  08/15/2016   • ANNUAL WELLNESS VISIT  08/15/2016   • MAMMOGRAM  08/15/2016   • Pneumococcal Vaccine 65+ (1 of 1 - PPSV23) 08/10/2019   • LIPID PANEL  04/10/2020   • COLONOSCOPY  2020   • INFLUENZA VACCINE  2020   • DXA SCAN  2021   • ZOSTER VACCINE  Completed       The additional following portions of the patient's history were reviewed and updated as appropriate: allergies, current medications, past  "family history, past medical history, past social history, past surgical history and problem list.    Review of Systems   Constitutional: Negative.    HENT: Negative.    Eyes: Negative.    Respiratory: Negative.    Cardiovascular: Negative.    Gastrointestinal: Negative.    Endocrine: Negative.    Genitourinary: Negative.    Musculoskeletal: Negative.    Skin: Negative.    Allergic/Immunologic: Negative.    Neurological: Negative.    Hematological: Negative.    Psychiatric/Behavioral: Negative.        I have reviewed and agree with the HPI, ROS, and historical information as entered above. Heather Cruz MD    Objective   /80   Ht 160 cm (62.99\")   Wt 71.2 kg (157 lb)   BMI 27.82 kg/m²     Physical Exam  Vitals signs and nursing note reviewed. Exam conducted with a chaperone present.   Constitutional:       Appearance: She is well-developed.   HENT:      Head: Normocephalic and atraumatic.   Neck:      Musculoskeletal: Normal range of motion. No muscular tenderness.      Thyroid: No thyroid mass or thyromegaly.   Pulmonary:      Effort: Pulmonary effort is normal. No retractions.   Chest:      Chest wall: No mass.      Breasts:         Right: Normal. No mass, nipple discharge, skin change or tenderness.         Left: Normal. No mass, nipple discharge, skin change or tenderness.   Abdominal:      General: Bowel sounds are normal.      Palpations: Abdomen is soft. Abdomen is not rigid. There is no mass.      Tenderness: There is no abdominal tenderness. There is no guarding.      Hernia: No hernia is present. There is no hernia in the left inguinal area or right inguinal area.   Genitourinary:     Exam position: Lithotomy position.      Pubic Area: No rash.       Labia:         Right: No rash, tenderness or lesion.         Left: No rash, tenderness or lesion.       Urethra: No urethral pain or urethral swelling.      Vagina: Normal. No vaginal discharge or lesions.      Cervix: No cervical motion tenderness, " discharge, lesion or cervical bleeding.      Uterus: Normal. Not enlarged, not fixed and not tender.       Adnexa:         Right: No mass, tenderness or fullness.          Left: No mass, tenderness or fullness.        Rectum: No external hemorrhoid.   Neurological:      Mental Status: She is alert and oriented to person, place, and time.   Psychiatric:         Behavior: Behavior normal.           Assessment/Plan       Problem List Items Addressed This Visit     None      Visit Diagnoses     Women's annual routine gynecological examination    -  Primary    Relevant Orders    Pap IG, Rfx HPV ASCU    Vaginal atrophy        Relevant Orders    Pap IG, Rfx HPV ASCU    Osteoporosis screening        Postmenopausal state          BDS today.     GYN annual well woman exam.     Recommended use of Vitamin D replacement and getting adequate calcium in her diet. (1500mg)  Reviewed monthly self breast exams.  Instructed to call with lumps, pain, or breast discharge.    Reviewed HPV guidelines.  Reviewed exercise as a preventative health measures.     Heather Cruz MD   10/21/2020

## 2020-11-03 DIAGNOSIS — N95.2 VAGINAL ATROPHY: ICD-10-CM

## 2020-11-03 DIAGNOSIS — Z01.419 WOMEN'S ANNUAL ROUTINE GYNECOLOGICAL EXAMINATION: ICD-10-CM

## 2021-05-26 ENCOUNTER — OFFICE VISIT (OUTPATIENT)
Dept: FAMILY MEDICINE CLINIC | Facility: CLINIC | Age: 66
End: 2021-05-26

## 2021-05-26 VITALS
OXYGEN SATURATION: 97 % | HEART RATE: 70 BPM | HEIGHT: 63 IN | RESPIRATION RATE: 20 BRPM | BODY MASS INDEX: 26.68 KG/M2 | WEIGHT: 150.6 LBS | TEMPERATURE: 98.4 F | SYSTOLIC BLOOD PRESSURE: 115 MMHG | DIASTOLIC BLOOD PRESSURE: 70 MMHG

## 2021-05-26 DIAGNOSIS — J30.89 ENVIRONMENTAL AND SEASONAL ALLERGIES: ICD-10-CM

## 2021-05-26 DIAGNOSIS — R73.09 ELEVATED GLUCOSE: ICD-10-CM

## 2021-05-26 DIAGNOSIS — K59.09 CHRONIC CONSTIPATION: ICD-10-CM

## 2021-05-26 DIAGNOSIS — Z11.59 ENCOUNTER FOR HEPATITIS C SCREENING TEST FOR LOW RISK PATIENT: ICD-10-CM

## 2021-05-26 DIAGNOSIS — E78.1 PURE HYPERTRIGLYCERIDEMIA: ICD-10-CM

## 2021-05-26 DIAGNOSIS — R74.01 ALT (SGPT) LEVEL RAISED: ICD-10-CM

## 2021-05-26 DIAGNOSIS — R73.09 ELEVATED GLUCOSE: Primary | ICD-10-CM

## 2021-05-26 DIAGNOSIS — J30.89 ENVIRONMENTAL AND SEASONAL ALLERGIES: Primary | ICD-10-CM

## 2021-05-26 DIAGNOSIS — E55.9 VITAMIN D DEFICIENCY: ICD-10-CM

## 2021-05-26 PROCEDURE — 99214 OFFICE O/P EST MOD 30 MIN: CPT | Performed by: NURSE PRACTITIONER

## 2021-05-26 RX ORDER — NEOMYCIN SULFATE, POLYMYXIN B SULFATE AND DEXAMETHASONE 3.5; 10000; 1 MG/ML; [USP'U]/ML; MG/ML
SUSPENSION/ DROPS OPHTHALMIC
COMMUNITY
End: 2021-05-26

## 2021-05-26 RX ORDER — AZELASTINE HCL 205.5 UG/1
SPRAY NASAL
COMMUNITY
End: 2021-05-26 | Stop reason: SDUPTHER

## 2021-05-26 RX ORDER — CHOLECALCIFEROL (VITAMIN D3) 1250 MCG
CAPSULE ORAL
COMMUNITY
Start: 2020-05-20 | End: 2021-05-26

## 2021-05-26 RX ORDER — SECUKINUMAB 150 MG/ML
INJECTION SUBCUTANEOUS
COMMUNITY

## 2021-05-26 NOTE — PROGRESS NOTES
"Chief Complaint  Annual Exam (NOTE: pt is fasting for labs )    Subjective          Heather Olmstead presents to [facility]  History of Present Illness  Annual well visit  Exercising regularly  Maintaining weight  Eating healthy KETO   Allergies   Taking allergy injections  Taking OTC meds as needed  Seeing Dermatologist for Psoriasis on scalp, still flared up, medication not helping  Having some stress incontinence, having to wear a pad  Up to date on Pap smear  Due for mammogram, thermal imaging today, if abnormal will get mammogram  Denies depression  Anxiety stable  Chronic constipation-since she was a girl   Taking Magnesium daily  Would like medication to help  Strong family hx of DM in sibling and parents  Covid vaccines gotten  Declines Pneumonia vaccine or TDap today  Screening colonoscopy up to date  Osteoporosis  Taking Magnesium and Vit D supplement  Up to date on DEXA  Post menopausal     Objective   Vital Signs:   /70   Pulse 70   Temp 98.4 °F (36.9 °C)   Resp 20   Ht 160 cm (62.99\")   Wt 68.3 kg (150 lb 9.6 oz)   SpO2 97%   BMI 26.68 kg/m²     Physical Exam  Vitals and nursing note reviewed.   Constitutional:       General: She is not in acute distress.     Appearance: Normal appearance. She is well-developed and normal weight.   HENT:      Head: Normocephalic.      Right Ear: External ear normal.      Left Ear: External ear normal.      Nose: Nose normal. No mucosal edema.      Right Sinus: No maxillary sinus tenderness or frontal sinus tenderness.      Left Sinus: No maxillary sinus tenderness or frontal sinus tenderness.      Mouth/Throat:      Pharynx: Uvula midline.   Eyes:      General: Lids are normal.   Neck:      Thyroid: No thyroid mass or thyromegaly.      Vascular: No carotid bruit or JVD.      Trachea: Trachea normal.   Cardiovascular:      Rate and Rhythm: Normal rate and regular rhythm.      Heart sounds: Normal heart sounds, S1 normal and S2 " normal. No murmur heard.   No friction rub. No gallop.    Pulmonary:      Effort: Pulmonary effort is normal.      Breath sounds: Normal breath sounds.   Abdominal:      Palpations: Abdomen is soft.   Musculoskeletal:         General: Normal range of motion.      Cervical back: Normal range of motion and neck supple.      Right lower leg: No edema.      Left lower leg: No edema.   Lymphadenopathy:      Head:      Right side of head: No tonsillar, preauricular, posterior auricular or occipital adenopathy.      Left side of head: No tonsillar, preauricular, posterior auricular or occipital adenopathy.      Cervical: No cervical adenopathy.   Skin:     General: Skin is warm and dry.      Capillary Refill: Capillary refill takes less than 2 seconds.      Findings: No rash.      Nails: There is no clubbing.   Neurological:      Mental Status: She is alert and oriented to person, place, and time.      Gait: Gait normal.      Deep Tendon Reflexes: Reflexes are normal and symmetric.   Psychiatric:         Mood and Affect: Mood normal.         Speech: Speech normal.         Behavior: Behavior normal.         Thought Content: Thought content normal.         Judgment: Judgment normal.        Result Review :                 Assessment and Plan    Diagnoses and all orders for this visit:    1. Elevated glucose (Primary)  -     Comprehensive Metabolic Panel  -     Hemoglobin A1c    2. Pure hypertriglyceridemia  -     Lipid Panel    3. Environmental and seasonal allergies  -     CBC & Differential    4. ALT (SGPT) level raised  -     Comprehensive Metabolic Panel    5. Chronic constipation  -     linaclotide (Linzess) 290 MCG capsule capsule; Take 1 capsule by mouth Every Morning Before Breakfast.  Dispense: 14 capsule; Refill: 0        Follow Up   Return in about 1 year (around 5/26/2022).  Patient was given instructions and counseling regarding her condition or for health maintenance advice. Please see specific information  pulled into the AVS if appropriate.   Will check labs and notify pt of results  Gave samples of Linzess 290 mg take one daily with glass of water prior to breakfast. Call back if helpful will send in prescription  Living will pt has one she just needs to drop it off

## 2021-05-27 LAB
ALBUMIN SERPL-MCNC: 4.4 G/DL (ref 3.5–5.2)
ALBUMIN/GLOB SERPL: 1.8 G/DL
ALP SERPL-CCNC: 66 U/L (ref 39–117)
ALT SERPL-CCNC: 17 U/L (ref 1–33)
AST SERPL-CCNC: 23 U/L (ref 1–32)
BASOPHILS # BLD AUTO: 0.08 10*3/MM3 (ref 0–0.2)
BASOPHILS NFR BLD AUTO: 0.9 % (ref 0–1.5)
BILIRUB SERPL-MCNC: 0.3 MG/DL (ref 0–1.2)
BUN SERPL-MCNC: 18 MG/DL (ref 8–23)
BUN/CREAT SERPL: 24 (ref 7–25)
CALCIUM SERPL-MCNC: 9.7 MG/DL (ref 8.6–10.5)
CHLORIDE SERPL-SCNC: 105 MMOL/L (ref 98–107)
CHOLEST SERPL-MCNC: 217 MG/DL (ref 0–200)
CO2 SERPL-SCNC: 26.4 MMOL/L (ref 22–29)
CREAT SERPL-MCNC: 0.75 MG/DL (ref 0.57–1)
EOSINOPHIL # BLD AUTO: 0.2 10*3/MM3 (ref 0–0.4)
EOSINOPHIL NFR BLD AUTO: 2.2 % (ref 0.3–6.2)
ERYTHROCYTE [DISTWIDTH] IN BLOOD BY AUTOMATED COUNT: 13.8 % (ref 12.3–15.4)
GLOBULIN SER CALC-MCNC: 2.5 GM/DL
GLUCOSE SERPL-MCNC: 87 MG/DL (ref 65–99)
HBA1C MFR BLD: 5.6 % (ref 4.8–5.6)
HCT VFR BLD AUTO: 43.7 % (ref 34–46.6)
HDLC SERPL-MCNC: 55 MG/DL (ref 40–60)
HGB BLD-MCNC: 14.1 G/DL (ref 12–15.9)
IMM GRANULOCYTES # BLD AUTO: 0.03 10*3/MM3 (ref 0–0.05)
IMM GRANULOCYTES NFR BLD AUTO: 0.3 % (ref 0–0.5)
LDLC SERPL CALC-MCNC: 142 MG/DL (ref 0–100)
LYMPHOCYTES # BLD AUTO: 1.85 10*3/MM3 (ref 0.7–3.1)
LYMPHOCYTES NFR BLD AUTO: 20 % (ref 19.6–45.3)
MCH RBC QN AUTO: 28.1 PG (ref 26.6–33)
MCHC RBC AUTO-ENTMCNC: 32.3 G/DL (ref 31.5–35.7)
MCV RBC AUTO: 87.2 FL (ref 79–97)
MONOCYTES # BLD AUTO: 1.19 10*3/MM3 (ref 0.1–0.9)
MONOCYTES NFR BLD AUTO: 12.8 % (ref 5–12)
NEUTROPHILS # BLD AUTO: 5.92 10*3/MM3 (ref 1.7–7)
NEUTROPHILS NFR BLD AUTO: 63.8 % (ref 42.7–76)
NRBC BLD AUTO-RTO: 0 /100 WBC (ref 0–0.2)
PLATELET # BLD AUTO: 270 10*3/MM3 (ref 140–450)
POTASSIUM SERPL-SCNC: 5.5 MMOL/L (ref 3.5–5.2)
PROT SERPL-MCNC: 6.9 G/DL (ref 6–8.5)
RBC # BLD AUTO: 5.01 10*6/MM3 (ref 3.77–5.28)
SODIUM SERPL-SCNC: 143 MMOL/L (ref 136–145)
TRIGL SERPL-MCNC: 113 MG/DL (ref 0–150)
VLDLC SERPL CALC-MCNC: 20 MG/DL (ref 5–40)
WBC # BLD AUTO: 9.27 10*3/MM3 (ref 3.4–10.8)

## 2021-09-21 ENCOUNTER — HOSPITAL ENCOUNTER (OUTPATIENT)
Dept: GENERAL RADIOLOGY | Facility: HOSPITAL | Age: 66
Discharge: HOME | End: 2021-09-21
Admitting: NURSE PRACTITIONER

## 2021-09-21 ENCOUNTER — OFFICE VISIT (OUTPATIENT)
Dept: FAMILY MEDICINE CLINIC | Facility: CLINIC | Age: 66
End: 2021-09-21

## 2021-09-21 VITALS
HEIGHT: 63 IN | TEMPERATURE: 97.1 F | DIASTOLIC BLOOD PRESSURE: 62 MMHG | BODY MASS INDEX: 26.44 KG/M2 | RESPIRATION RATE: 18 BRPM | OXYGEN SATURATION: 96 % | HEART RATE: 86 BPM | SYSTOLIC BLOOD PRESSURE: 118 MMHG | WEIGHT: 149.2 LBS

## 2021-09-21 DIAGNOSIS — M54.50 ACUTE MIDLINE LOW BACK PAIN WITHOUT SCIATICA: Primary | ICD-10-CM

## 2021-09-21 PROCEDURE — 99213 OFFICE O/P EST LOW 20 MIN: CPT | Performed by: NURSE PRACTITIONER

## 2021-09-21 PROCEDURE — 72110 X-RAY EXAM L-2 SPINE 4/>VWS: CPT

## 2021-09-21 RX ORDER — AZELASTINE HYDROCHLORIDE 137 UG/1
SPRAY, METERED NASAL
COMMUNITY
Start: 2021-08-25

## 2021-09-21 NOTE — PROGRESS NOTES
"Chief Complaint  Back Pain (x 2 months)    Subjective          Heather Olmstead presents to [facility]  History of Present Illness  Low back pain for the past 2 months  Using Arnica for pain but it has not helped pain  Pain is located in the center of back on the mitch spine, worse pain with bending over or if laying down flat; has to sit down to dress.  Had a massage and seen chiropractor and naturalpathic MD  Wants to get an XR.  She plays golf and the low back pain is not affecting her golf swing  No sciatica, no weakness or numbness or loss of bowel or bladder fx    Objective   Vital Signs:   /62   Pulse 86   Temp 97.1 °F (36.2 °C)   Resp 18   Ht 160 cm (63\")   Wt 67.7 kg (149 lb 3.2 oz)   SpO2 96%   BMI 26.43 kg/m²     Physical Exam  Musculoskeletal:         General: Tenderness present.      Lumbar back: Bony tenderness present. No spasms. Decreased range of motion. No scoliosis.   Skin:     General: Skin is warm and dry.   Neurological:      General: No focal deficit present.      Mental Status: She is oriented to person, place, and time.      Gait: Gait normal.        Result Review :                 Assessment and Plan    Diagnoses and all orders for this visit:    1. Acute midline low back pain without sciatica (Primary)        Follow Up   No follow-ups on file.  Patient was given instructions and counseling regarding her condition or for health maintenance advice. Please see specific information pulled into the AVS if appropriate.       Answers for HPI/ROS submitted by the patient on 9/16/2021  What is the primary reason for your visit?: Back Pain      "

## 2021-09-27 DIAGNOSIS — M54.50 ACUTE MIDLINE LOW BACK PAIN WITHOUT SCIATICA: Primary | ICD-10-CM

## 2021-09-27 DIAGNOSIS — M67.442 GANGLION CYST OF BOTH HANDS: ICD-10-CM

## 2021-09-27 DIAGNOSIS — M67.441 GANGLION CYST OF BOTH HANDS: ICD-10-CM

## 2021-10-27 RX ORDER — CONJUGATED ESTROGENS 0.62 MG/G
CREAM VAGINAL
Qty: 30 G | Refills: 0 | Status: SHIPPED | OUTPATIENT
Start: 2021-10-27

## 2021-11-19 DIAGNOSIS — G89.29 CHRONIC MIDLINE LOW BACK PAIN WITHOUT SCIATICA: Primary | ICD-10-CM

## 2021-11-19 DIAGNOSIS — M54.50 CHRONIC MIDLINE LOW BACK PAIN WITHOUT SCIATICA: Primary | ICD-10-CM

## 2022-10-27 ENCOUNTER — OFFICE VISIT (OUTPATIENT)
Dept: FAMILY MEDICINE CLINIC | Facility: CLINIC | Age: 67
End: 2022-10-27

## 2022-10-27 VITALS
TEMPERATURE: 97.3 F | RESPIRATION RATE: 16 BRPM | HEIGHT: 62 IN | OXYGEN SATURATION: 99 % | BODY MASS INDEX: 30.18 KG/M2 | SYSTOLIC BLOOD PRESSURE: 118 MMHG | DIASTOLIC BLOOD PRESSURE: 78 MMHG | HEART RATE: 68 BPM | WEIGHT: 164 LBS

## 2022-10-27 DIAGNOSIS — R68.89 FORGETFULNESS: ICD-10-CM

## 2022-10-27 DIAGNOSIS — Z00.00 MEDICARE ANNUAL WELLNESS VISIT, SUBSEQUENT: Primary | ICD-10-CM

## 2022-10-27 DIAGNOSIS — E55.9 VITAMIN D DEFICIENCY: ICD-10-CM

## 2022-10-27 DIAGNOSIS — E78.1 PURE HYPERTRIGLYCERIDEMIA: ICD-10-CM

## 2022-10-27 DIAGNOSIS — E66.09 CLASS 1 OBESITY DUE TO EXCESS CALORIES WITHOUT SERIOUS COMORBIDITY WITH BODY MASS INDEX (BMI) OF 30.0 TO 30.9 IN ADULT: ICD-10-CM

## 2022-10-27 PROCEDURE — G0439 PPPS, SUBSEQ VISIT: HCPCS | Performed by: FAMILY MEDICINE

## 2022-10-27 PROCEDURE — 3288F FALL RISK ASSESSMENT DOCD: CPT | Performed by: FAMILY MEDICINE

## 2022-10-27 PROCEDURE — 1159F MED LIST DOCD IN RCRD: CPT | Performed by: FAMILY MEDICINE

## 2022-10-27 PROCEDURE — 1125F AMNT PAIN NOTED PAIN PRSNT: CPT | Performed by: FAMILY MEDICINE

## 2022-10-27 PROCEDURE — 1170F FXNL STATUS ASSESSED: CPT | Performed by: FAMILY MEDICINE

## 2022-10-27 PROCEDURE — 3008F BODY MASS INDEX DOCD: CPT | Performed by: FAMILY MEDICINE
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
## 2022-10-27 NOTE — PATIENT INSTRUCTIONS
Medicare Wellness  Personal Prevention Plan of Service     Date of Office Visit:    Encounter Provider:  Dinaa Montgomery DO  Place of Service:  [facility]  Patient Name: Heather Olmstead  :  1955    As part of the Medicare Wellness portion of your visit today, we are providing you with this personalized preventive plan of services (PPPS). This plan is based upon recommendations of the [place] Preventive Services Task Force (USPSTF) and the Advisory Committee on Immunization Practices (ACIP).    This lists the preventive care services that should be considered, and provides dates of when you are due. Items listed as completed are up-to-date and do not require any further intervention.    Health Maintenance   Topic Date Due    TDAP/TD VACCINES (1 - Tdap) Never done    LIPID PANEL  2022    COVID-19 Vaccine (5 - Booster for Pfizer series) 2022    DXA SCAN  10/21/2022    INFLUENZA VACCINE  2023 (Originally 2022)    Pneumococcal Vaccine 65+ (1 - PCV) 2023 (Originally 8/10/2019)    ANNUAL WELLNESS VISIT  10/27/2023    PAP SMEAR  2024    COLORECTAL CANCER SCREENING  2026    HEPATITIS C SCREENING  Completed    ZOSTER VACCINE  Completed    MAMMOGRAM  Discontinued       Orders Placed This Encounter   Procedures    Comprehensive Metabolic Panel     Order Specific Question:   Release to patient     Answer:   Immediate    Lipid Panel With / Chol / HDL Ratio    TSH Rfx On Abnormal To Free T4    Vitamin D,25-Hydroxy     Order Specific Question:   Release to patient     Answer:   Routine Release    CBC & Differential     Order Specific Question:   Manual Differential     Answer:   No       No follow-ups on file.

## 2022-10-28 LAB
25(OH)D3+25(OH)D2 SERPL-MCNC: 70.3 NG/ML (ref 30–100)
ALBUMIN SERPL-MCNC: 4.6 G/DL (ref 3.5–5.2)
ALBUMIN/GLOB SERPL: 1.8 G/DL
ALP SERPL-CCNC: 106 U/L (ref 39–117)
ALT SERPL-CCNC: 21 U/L (ref 1–33)
AST SERPL-CCNC: 25 U/L (ref 1–32)
BASOPHILS # BLD AUTO: 0.07 10*3/MM3 (ref 0–0.2)
BASOPHILS NFR BLD AUTO: 0.8 % (ref 0–1.5)
BILIRUB SERPL-MCNC: 0.4 MG/DL (ref 0–1.2)
BUN SERPL-MCNC: 11 MG/DL (ref 8–23)
BUN/CREAT SERPL: 12.8 (ref 7–25)
CALCIUM SERPL-MCNC: 9.5 MG/DL (ref 8.6–10.5)
CHLORIDE SERPL-SCNC: 104 MMOL/L (ref 98–107)
CHOLEST SERPL-MCNC: 222 MG/DL (ref 0–200)
CHOLEST/HDLC SERPL: 3.83 {RATIO}
CO2 SERPL-SCNC: 28.2 MMOL/L (ref 22–29)
CREAT SERPL-MCNC: 0.86 MG/DL (ref 0.57–1)
EGFRCR SERPLBLD CKD-EPI 2021: 73.7 ML/MIN/1.73
EOSINOPHIL # BLD AUTO: 0.21 10*3/MM3 (ref 0–0.4)
EOSINOPHIL NFR BLD AUTO: 2.3 % (ref 0.3–6.2)
ERYTHROCYTE [DISTWIDTH] IN BLOOD BY AUTOMATED COUNT: 13.5 % (ref 12.3–15.4)
FOLATE SERPL-MCNC: >20 NG/ML (ref 4.78–24.2)
GLOBULIN SER CALC-MCNC: 2.5 GM/DL
GLUCOSE SERPL-MCNC: 95 MG/DL (ref 65–99)
HCT VFR BLD AUTO: 43.5 % (ref 34–46.6)
HDLC SERPL-MCNC: 58 MG/DL (ref 40–60)
HGB BLD-MCNC: 14.7 G/DL (ref 12–15.9)
IMM GRANULOCYTES # BLD AUTO: 0.05 10*3/MM3 (ref 0–0.05)
IMM GRANULOCYTES NFR BLD AUTO: 0.6 % (ref 0–0.5)
LDLC SERPL CALC-MCNC: 142 MG/DL (ref 0–100)
LYMPHOCYTES # BLD AUTO: 1.74 10*3/MM3 (ref 0.7–3.1)
LYMPHOCYTES NFR BLD AUTO: 19.2 % (ref 19.6–45.3)
MCH RBC QN AUTO: 27.5 PG (ref 26.6–33)
MCHC RBC AUTO-ENTMCNC: 33.8 G/DL (ref 31.5–35.7)
MCV RBC AUTO: 81.5 FL (ref 79–97)
MONOCYTES # BLD AUTO: 1 10*3/MM3 (ref 0.1–0.9)
MONOCYTES NFR BLD AUTO: 11 % (ref 5–12)
NEUTROPHILS # BLD AUTO: 6.01 10*3/MM3 (ref 1.7–7)
NEUTROPHILS NFR BLD AUTO: 66.1 % (ref 42.7–76)
NRBC BLD AUTO-RTO: 0 /100 WBC (ref 0–0.2)
PLATELET # BLD AUTO: 272 10*3/MM3 (ref 140–450)
POTASSIUM SERPL-SCNC: 4.8 MMOL/L (ref 3.5–5.2)
PROT SERPL-MCNC: 7.1 G/DL (ref 6–8.5)
RBC # BLD AUTO: 5.34 10*6/MM3 (ref 3.77–5.28)
SODIUM SERPL-SCNC: 142 MMOL/L (ref 136–145)
T4 FREE SERPL-MCNC: 1.15 NG/DL (ref 0.93–1.7)
TRIGL SERPL-MCNC: 122 MG/DL (ref 0–150)
TSH SERPL DL<=0.005 MIU/L-ACNC: 6.89 UIU/ML (ref 0.27–4.2)
VIT B12 SERPL-MCNC: 995 PG/ML (ref 211–946)
VLDLC SERPL CALC-MCNC: 22 MG/DL (ref 5–40)
WBC # BLD AUTO: 9.08 10*3/MM3 (ref 3.4–10.8)

## 2022-11-01 DIAGNOSIS — R79.89 ELEVATED TSH: Primary | ICD-10-CM

## 2022-11-01 DIAGNOSIS — R94.6 ABNORMAL THYROID FUNCTION TEST: ICD-10-CM

## 2023-04-11 ENCOUNTER — OFFICE VISIT (OUTPATIENT)
Dept: FAMILY MEDICINE CLINIC | Facility: CLINIC | Age: 68
End: 2023-04-11
Payer: MEDICARE

## 2023-04-11 VITALS
HEART RATE: 76 BPM | BODY MASS INDEX: 31.83 KG/M2 | HEIGHT: 62 IN | WEIGHT: 173 LBS | DIASTOLIC BLOOD PRESSURE: 82 MMHG | RESPIRATION RATE: 18 BRPM | TEMPERATURE: 97.3 F | SYSTOLIC BLOOD PRESSURE: 128 MMHG

## 2023-04-11 DIAGNOSIS — H60.393 OTHER INFECTIVE ACUTE OTITIS EXTERNA OF BOTH EARS: ICD-10-CM

## 2023-04-11 DIAGNOSIS — H91.8X3 OTHER SPECIFIED HEARING LOSS OF BOTH EARS: Primary | ICD-10-CM

## 2023-04-11 PROCEDURE — 1159F MED LIST DOCD IN RCRD: CPT | Performed by: FAMILY MEDICINE

## 2023-04-11 PROCEDURE — 1160F RVW MEDS BY RX/DR IN RCRD: CPT | Performed by: FAMILY MEDICINE

## 2023-04-11 PROCEDURE — 99213 OFFICE O/P EST LOW 20 MIN: CPT | Performed by: FAMILY MEDICINE

## 2023-04-11 NOTE — PROGRESS NOTES
"Chief Complaint  Chief Complaint   Patient presents with   • Earache     both   • Eye Problem        Subjective            History of Present Illness  Heather Olmstead presents to [facility]    The patient presents today accompanied by her .    Bilateral otitis externa with hearing loss  The patient is unable to hear very well. She has gone to [facility] 2 times, and she was prescribed Cipro drops for her ears and eyes, which are also itchy and watery. The Cipro did not help. She started having trouble hearing 2 weeks ago suddenly. The patient's  states that she could not hear at all. The patient feels like she is \"in a washing machine.\" and she can hear a thumping sound in both ears. The patient has a significant amount of pressure in her ear. The patient's  notes that the patient must have the TV so loud that the neighbors can hear it. Her  believes that she has parasites in her ears. The ear drainage started approximately 2 weeks ago. She denies going swimming. The patient has a chronic cough, but nothing drastic. The patient's  notes that the patient has allergies which is causing the cough. She has tubes in both ears. The patient was in [place] when the hearing issues began. The patient has tubes in both ears that were implanted by Dr. Yvan Llanes. The patient also has psoriasis in her ears, and her  notes that the patient has started Ilumya for the psoriasis. She reports that she does not have much pain. Occasionally, the patient can feel like there is a heartbeat in her ears, but she does not experience much pain. The patient notes that her hearing aids will not even work because the debris in her ears is blocking them.     Eye issues  The patient's eye issues started a couple of days before the hearing loss. She is scheduled for surgery on her eye on 04/14/2023 at [facility]. She had cataract surgery in " "the past, and now they are \"taking the film off.\" Her eyes are itchy, and they become \"a little crusty.\"     REVIEW OF SYSTEMS  A review of systems was performed, and the pertinent positives are noted in the HPI.    Objective     Vital Signs:   /72   Pulse 78   Temp 97.2 °F (36.2 °C)   Resp 18   Ht 170.2 cm (67\")   Wt 72.1 kg (159 lb)   BMI 24.90 kg/m²       Physical Exam   Result Review :     Physical Exam  Vitals and nursing note reviewed.   Constitutional:       Appearance: She is well-developed.   HENT:      Head: Normocephalic and atraumatic.      Right Ear: Decreased hearing noted.      Left Ear: Decreased hearing noted.      Ears:      Comments: There is significant bilateral swelling and discharge out of both ears.  Unable to see the tympanic membrane clearly.  There appears to be debris within the ear canal.  No significant tenderness with palpation.  Hearing is definitely diminished bilaterally.  Eyes:      Pupils: Pupils are equal, round, and reactive to light.      Comments: Mild erythema of the sclera.  No discharge.   Pulmonary:      Effort: Pulmonary effort is normal.   Skin:     General: Skin is warm.   Neurological:      Mental Status: She is alert and oriented to person, place, and time.   Psychiatric:         Behavior: Behavior normal.                     Assessment and Plan      Diagnoses and all orders for this visit:    Other specified hearing loss of both ears  -     Ambulatory Referral to ENT (Otolaryngology)    Other infective acute otitis externa of both ears         Severe bilateral otitis externa with hearing loss  - Need to urgently refer to ENT for evaluation. She is not improving with Cipro. There is a significant amount of debris in her ear canals and this needs to be cleaned out before we can see what is going on in her ears. She is agreeable. She also has bilateral erythema of the eyes and not sure if that is related. She does have a procedure coming up this week for " removal of a layer over her eye due to previous cataract surgery through [facility].    Unfortunately, there was no ear nose and throat doctor in the area that could see her today or tomorrow.  She has an appointment on Thursday, April 13, 2023.  Continue Cipro drops for now and follow-up if worsening or call.      Follow Up              Transcribed from ambient dictation for Martell Davis MD by Mira Cook.  04/11/23   14:47 EDT    Patient or patient representative verbalized consent to the visit recording.  I have personally performed the services described in this document as transcribed by the above individual, and it is both accurate and complete.  Martell Davis MD  4/11/2023  18:34 EDT

## 2023-05-02 ENCOUNTER — HOSPITAL ENCOUNTER (OUTPATIENT)
Dept: GENERAL RADIOLOGY | Facility: HOSPITAL | Age: 68
Discharge: HOME | End: 2023-05-02
Admitting: PHYSICIAN ASSISTANT
Payer: MEDICARE

## 2023-05-02 ENCOUNTER — OFFICE VISIT (OUTPATIENT)
Dept: FAMILY MEDICINE CLINIC | Facility: CLINIC | Age: 68
End: 2023-05-02
Payer: MEDICARE

## 2023-05-02 VITALS
SYSTOLIC BLOOD PRESSURE: 128 MMHG | OXYGEN SATURATION: 99 % | BODY MASS INDEX: 31.5 KG/M2 | WEIGHT: 171.2 LBS | DIASTOLIC BLOOD PRESSURE: 66 MMHG | RESPIRATION RATE: 18 BRPM | HEART RATE: 89 BPM | HEIGHT: 62 IN | TEMPERATURE: 97.3 F

## 2023-05-02 DIAGNOSIS — M79.641 RIGHT HAND PAIN: ICD-10-CM

## 2023-05-02 DIAGNOSIS — R05.3 PERSISTENT COUGH FOR 3 WEEKS OR LONGER: Primary | ICD-10-CM

## 2023-05-02 DIAGNOSIS — R09.89 GLOBUS SENSATION: ICD-10-CM

## 2023-05-02 DIAGNOSIS — R73.9 ELEVATED BLOOD SUGAR: ICD-10-CM

## 2023-05-02 DIAGNOSIS — R94.6 ABNORMAL RESULTS OF THYROID FUNCTION STUDIES: ICD-10-CM

## 2023-05-02 DIAGNOSIS — M25.50 ARTHRALGIA, UNSPECIFIED JOINT: ICD-10-CM

## 2023-05-02 DIAGNOSIS — R79.89 ELEVATED TSH: ICD-10-CM

## 2023-05-02 PROCEDURE — 73130 X-RAY EXAM OF HAND: CPT

## 2023-05-02 PROCEDURE — 71046 X-RAY EXAM CHEST 2 VIEWS: CPT

## 2023-05-02 RX ORDER — CLOTRIMAZOLE AND BETAMETHASONE DIPROPIONATE 10; .64 MG/G; MG/G
CREAM TOPICAL
COMMUNITY
Start: 2023-04-15

## 2023-05-02 RX ORDER — CIPROFLOXACIN HYDROCHLORIDE 3.5 MG/ML
SOLUTION/ DROPS TOPICAL
COMMUNITY
Start: 2023-04-01

## 2023-05-02 NOTE — PROGRESS NOTES
"Subjective   Heather Olmstead is a 67 y.o. female.   Chief Complaint   Patient presents with   • Cough     Pt has had cough for 8 months-has been seen [facility] provider for this for 8 months      History of Present Illness   Persistent cough X 8 months. +globus sensation of throat. Feels like phlegm is stuck there. Denies heartburn or reflux   Hx of elevated TSH 6 months ago. Does not look like this was followed up on and patient is not on thyroid medication   Has seen a naturopathic provider for this and has tried multiple supplements without relief   Sees ENT   Has ear tubes   Using drops   Bilateral hearing loss     Ongoing right hand pain and arthralgias     The following portions of the patient's history were reviewed and updated as appropriate: allergies, current medications, past family history, past medical history, past social history, past surgical history and problem list.    Review of Systems  As noted per HPI     Objective    Blood pressure 128/66, pulse 89, temperature 97.3 °F (36.3 °C), resp. rate 18, height 157.5 cm (62\"), weight 77.7 kg (171 lb 3.2 oz), SpO2 99 %, not currently breastfeeding.     Physical Exam  Vitals and nursing note reviewed.   Constitutional:       Appearance: Normal appearance.   HENT:      Head: Normocephalic.      Right Ear: External ear normal. A PE tube is present.      Left Ear: External ear normal. A PE tube is present.      Nose: Nose normal.      Mouth/Throat:      Pharynx: No oropharyngeal exudate or posterior oropharyngeal erythema.   Eyes:      Conjunctiva/sclera: Conjunctivae normal.   Cardiovascular:      Rate and Rhythm: Normal rate and regular rhythm.   Pulmonary:      Effort: Pulmonary effort is normal.      Breath sounds: Normal breath sounds.   Musculoskeletal:      Right hand: Tenderness and bony tenderness present.   Skin:     General: Skin is warm and dry.   Neurological:      Mental Status: She is alert and oriented to person, place, and time. "   Psychiatric:         Mood and Affect: Mood normal.         Behavior: Behavior normal.         Assessment & Plan   Diagnoses and all orders for this visit:    1. Persistent cough for 3 weeks or longer (Primary)  -     XR Chest PA & Lateral    2. Elevated TSH  -     TSH  -     T4, free  -     T3, free  -     Thyroid Antibodies    3. Elevated blood sugar  -     Hemoglobin A1c  -     Insulin, Total    4. Arthralgia, unspecified joint  -     CATE  -     Rheumatoid Factor    5. Right hand pain  -     XR Hand 3+ View Right    6. Abnormal results of thyroid function studies  -     TSH  -     T4, free    7. Globus sensation  -     US Thyroid      Screening chest XR for ongoing cough   Check labs as noted   Proceed with XR for R hand   Check US of thyroid for globus sensation. Consider EGD if symptoms persist

## 2023-05-04 DIAGNOSIS — R76.8 POSITIVE ANA (ANTINUCLEAR ANTIBODY): ICD-10-CM

## 2023-05-04 DIAGNOSIS — M25.50 ARTHRALGIA, UNSPECIFIED JOINT: Primary | ICD-10-CM

## 2023-05-04 LAB
ANA SER QL: POSITIVE
DSDNA AB SER-ACNC: <1 IU/ML (ref 0–9)
HBA1C MFR BLD: 6 % (ref 4.8–5.6)
INSULIN SERPL-ACNC: 17 UIU/ML (ref 2.6–24.9)
Lab: NORMAL
RHEUMATOID FACT SERPL-ACNC: <10 IU/ML
T3FREE SERPL-MCNC: 3.3 PG/ML (ref 2–4.4)
T4 FREE SERPL-MCNC: 1.5 NG/DL (ref 0.93–1.7)
THYROGLOB AB SERPL-ACNC: <1 IU/ML (ref 0–0.9)
THYROPEROXIDASE AB SERPL-ACNC: <9 IU/ML (ref 0–34)
TSH SERPL DL<=0.005 MIU/L-ACNC: 2.47 UIU/ML (ref 0.27–4.2)

## 2023-05-05 ENCOUNTER — TELEPHONE (OUTPATIENT)
Dept: FAMILY MEDICINE CLINIC | Facility: CLINIC | Age: 68
End: 2023-05-05
Payer: MEDICARE

## 2023-05-16 ENCOUNTER — HOSPITAL ENCOUNTER (OUTPATIENT)
Dept: ULTRASOUND IMAGING | Facility: HOSPITAL | Age: 68
Discharge: HOME | End: 2023-05-16
Admitting: PHYSICIAN ASSISTANT
Payer: MEDICARE

## 2023-05-16 PROCEDURE — 76536 US EXAM OF HEAD AND NECK: CPT

## 2023-05-18 ENCOUNTER — TELEPHONE (OUTPATIENT)
Dept: FAMILY MEDICINE CLINIC | Facility: CLINIC | Age: 68
End: 2023-05-18

## 2023-08-09 ENCOUNTER — TELEMEDICINE (OUTPATIENT)
Dept: FAMILY MEDICINE CLINIC | Facility: CLINIC | Age: 68
End: 2023-08-09
Payer: MEDICARE

## 2023-08-09 DIAGNOSIS — H10.31 ACUTE BACTERIAL CONJUNCTIVITIS OF RIGHT EYE: Primary | ICD-10-CM

## 2023-08-09 PROCEDURE — 99213 OFFICE O/P EST LOW 20 MIN: CPT | Performed by: FAMILY MEDICINE

## 2023-08-09 RX ORDER — OFLOXACIN 3 MG/ML
SOLUTION/ DROPS OPHTHALMIC
Qty: 5 ML | Refills: 0 | Status: SHIPPED | OUTPATIENT
Start: 2023-08-09

## 2023-08-09 NOTE — PROGRESS NOTES
Chief Complaint  Right eye drainage  (Started yesterday ), Cough (Started on Friday ), and Headache (Started on Friday  / see a Dr. Goncalves yesterday and was examined and told it was just something viral. (Homeopathic physician) )  The use of a video visit has been reviewed with the patient and verbal informed consent has been obtained. Myself and Heather Olmstead participated in this visit. The patient is located in [place], [place]. I am located in [place], [place]. RFEyeD video was utilized during this encounter.     Subjective          Heather Olmstead presents to [facility]  History of Present Illness  Right eye drainage and redness started yesterday  Matted together this AM when she woke  Eye feels gritty and blurred vision  Treating with warm compresses       The following portions of the patient's history were reviewed and updated as appropriate: allergies, current medications, past family history, past medical history, past social history, past surgical history, and problem list.    Objective      Physical Exam  Eyes:      General: Lids are normal.      Extraocular Movements: Extraocular movements intact.      Conjunctiva/sclera:      Right eye: Right conjunctiva is injected. Exudate present. No hemorrhage.     Result Review :                Assessment and Plan    Diagnoses and all orders for this visit:    1. Acute bacterial conjunctivitis of right eye (Primary)  -     ofloxacin (Ocuflox) 0.3 % ophthalmic solution; 1-2 gtt in right eye q2 hours while awake x 2 days, then 1-2 gtt QID x 5 days  Dispense: 5 mL; Refill: 0    Abx eye gtt to treat  Continue warm compresses as needed.     I spent 6 minutes caring for Heather on this date of service. This time includes time spent by me in the following activities:performing a medically appropriate examination and/or evaluation , ordering medications, tests, or procedures, and documenting information in the medical record  Follow Up   No  follow-ups on file.  Patient was given instructions and counseling regarding her condition or for health maintenance advice. Please see specific information pulled into the AVS if appropriate.

## 2024-02-07 ENCOUNTER — HOSPITAL ENCOUNTER (OUTPATIENT)
Dept: GENERAL RADIOLOGY | Facility: HOSPITAL | Age: 69
Discharge: HOME | End: 2024-02-07
Admitting: FAMILY MEDICINE
Payer: MEDICARE

## 2024-02-07 ENCOUNTER — OFFICE VISIT (OUTPATIENT)
Dept: FAMILY MEDICINE CLINIC | Facility: CLINIC | Age: 69
End: 2024-02-07
Payer: MEDICARE

## 2024-02-07 VITALS
TEMPERATURE: 97.3 F | SYSTOLIC BLOOD PRESSURE: 136 MMHG | BODY MASS INDEX: 32.87 KG/M2 | OXYGEN SATURATION: 98 % | WEIGHT: 178.6 LBS | RESPIRATION RATE: 18 BRPM | HEIGHT: 62 IN | DIASTOLIC BLOOD PRESSURE: 80 MMHG | HEART RATE: 72 BPM

## 2024-02-07 DIAGNOSIS — M79.674 GREAT TOE PAIN, RIGHT: ICD-10-CM

## 2024-02-07 DIAGNOSIS — R73.03 PREDIABETES: ICD-10-CM

## 2024-02-07 DIAGNOSIS — Z01.818 PREOP EXAMINATION: ICD-10-CM

## 2024-02-07 DIAGNOSIS — Z01.818 PREOP EXAMINATION: Primary | ICD-10-CM

## 2024-02-07 LAB
ALBUMIN SERPL-MCNC: 4.2 G/DL (ref 3.5–5.2)
ALBUMIN/GLOB SERPL: 1.6 G/DL
ALP SERPL-CCNC: 93 U/L (ref 39–117)
ALT SERPL-CCNC: 39 U/L (ref 1–33)
AST SERPL-CCNC: 33 U/L (ref 1–32)
BASOPHILS # BLD AUTO: 0.08 10*3/MM3 (ref 0–0.2)
BASOPHILS NFR BLD AUTO: 1 % (ref 0–1.5)
BILIRUB SERPL-MCNC: 0.4 MG/DL (ref 0–1.2)
BUN SERPL-MCNC: 13 MG/DL (ref 8–23)
BUN/CREAT SERPL: 13.7 (ref 7–25)
CALCIUM SERPL-MCNC: 9.3 MG/DL (ref 8.6–10.5)
CHLORIDE SERPL-SCNC: 105 MMOL/L (ref 98–107)
CO2 SERPL-SCNC: 25.5 MMOL/L (ref 22–29)
CREAT SERPL-MCNC: 0.95 MG/DL (ref 0.57–1)
EGFRCR SERPLBLD CKD-EPI 2021: 65 ML/MIN/1.73
EOSINOPHIL # BLD AUTO: 0.29 10*3/MM3 (ref 0–0.4)
EOSINOPHIL NFR BLD AUTO: 3.7 % (ref 0.3–6.2)
ERYTHROCYTE [DISTWIDTH] IN BLOOD BY AUTOMATED COUNT: 13.8 % (ref 12.3–15.4)
GLOBULIN SER CALC-MCNC: 2.7 GM/DL
GLUCOSE SERPL-MCNC: 87 MG/DL (ref 65–99)
HBA1C MFR BLD: 6.2 % (ref 4.8–5.6)
HCT VFR BLD AUTO: 43.2 % (ref 34–46.6)
HGB BLD-MCNC: 14 G/DL (ref 12–15.9)
IMM GRANULOCYTES # BLD AUTO: 0.06 10*3/MM3 (ref 0–0.05)
IMM GRANULOCYTES NFR BLD AUTO: 0.8 % (ref 0–0.5)
INR PPP: 0.98 (ref 0.89–1.12)
LYMPHOCYTES # BLD AUTO: 1.71 10*3/MM3 (ref 0.7–3.1)
LYMPHOCYTES NFR BLD AUTO: 22 % (ref 19.6–45.3)
MCH RBC QN AUTO: 28.3 PG (ref 26.6–33)
MCHC RBC AUTO-ENTMCNC: 32.4 G/DL (ref 31.5–35.7)
MCV RBC AUTO: 87.4 FL (ref 79–97)
MONOCYTES # BLD AUTO: 0.87 10*3/MM3 (ref 0.1–0.9)
MONOCYTES NFR BLD AUTO: 11.2 % (ref 5–12)
NEUTROPHILS # BLD AUTO: 4.77 10*3/MM3 (ref 1.7–7)
NEUTROPHILS NFR BLD AUTO: 61.3 % (ref 42.7–76)
NRBC BLD AUTO-RTO: 0 /100 WBC (ref 0–0.2)
PLATELET # BLD AUTO: 288 10*3/MM3 (ref 140–450)
POTASSIUM SERPL-SCNC: 5 MMOL/L (ref 3.5–5.2)
PROT SERPL-MCNC: 6.9 G/DL (ref 6–8.5)
PROTHROMBIN TIME: 13.1 SECONDS (ref 12.2–14.5)
RBC # BLD AUTO: 4.94 10*6/MM3 (ref 3.77–5.28)
SODIUM SERPL-SCNC: 143 MMOL/L (ref 136–145)
WBC # BLD AUTO: 7.78 10*3/MM3 (ref 3.4–10.8)

## 2024-02-07 PROCEDURE — 99214 OFFICE O/P EST MOD 30 MIN: CPT | Performed by: FAMILY MEDICINE

## 2024-02-07 PROCEDURE — 93005 ELECTROCARDIOGRAM TRACING: CPT | Performed by: FAMILY MEDICINE

## 2024-02-07 PROCEDURE — 71046 X-RAY EXAM CHEST 2 VIEWS: CPT

## 2024-02-07 PROCEDURE — 1160F RVW MEDS BY RX/DR IN RCRD: CPT | Performed by: FAMILY MEDICINE

## 2024-02-07 PROCEDURE — 1159F MED LIST DOCD IN RCRD: CPT | Performed by: FAMILY MEDICINE

## 2024-02-07 RX ORDER — FLUTICASONE PROPIONATE 50 MCG
SPRAY, SUSPENSION (ML) NASAL DAILY
COMMUNITY

## 2024-02-07 RX ORDER — DICLOFENAC SODIUM 75 MG/1
75 TABLET, DELAYED RELEASE ORAL 2 TIMES DAILY
COMMUNITY

## 2024-02-07 RX ORDER — CETIRIZINE HYDROCHLORIDE 10 MG/1
10 TABLET ORAL DAILY
COMMUNITY

## 2024-02-07 RX ORDER — FLUOCINONIDE TOPICAL SOLUTION USP, 0.05% 0.5 MG/ML
SOLUTION TOPICAL
COMMUNITY

## 2024-02-07 RX ORDER — AZELASTINE HYDROCHLORIDE 137 UG/1
SPRAY, METERED NASAL
COMMUNITY
Start: 2023-11-15

## 2024-02-07 RX ORDER — METHOCARBAMOL 750 MG/1
750 TABLET, FILM COATED ORAL 4 TIMES DAILY
COMMUNITY

## 2024-02-07 NOTE — PROGRESS NOTES
Chief Complaint  Pre-op Exam (Toe implant)    Subjective          Heather Olmstead presents to [facility]  History of Present Illness  Pre-Op Evaluation  Heather Olmstead is a 68 y.o. female who presents to the office today for a preoperative consultation at the request of surgeon Dr. Gladis Christianson who plans on performing cheilectomy with BioPoly implant on TBD. This consultation is requested for the specific conditions prompting preoperative evaluation (i.e. because of potential affect on operative risk): general clearance. Planned anesthesia is general. The patient has the following known anesthesia issues:  none . Patient has a bleeding risk of: no recent abnormal bleeding and no remote history of abnormal bleeding.      The following portions of the patient's history were reviewed and updated as appropriate: allergies, current medications, past family history, past medical history, past social history, past surgical history, and problem list.    Objective      Physical Exam  Vitals reviewed.   Constitutional:       Appearance: Normal appearance.   Cardiovascular:      Rate and Rhythm: Normal rate.      Heart sounds: Normal heart sounds.   Pulmonary:      Effort: Pulmonary effort is normal.      Breath sounds: Normal breath sounds.   Musculoskeletal:         General: No swelling.   Neurological:      Mental Status: She is alert.        Result Review :           ECG 12 Lead    Date/Time: 2/7/2024 11:12 AM  Performed by: Diana Montgomery DO    Authorized by: Diana Montgomery DO  Comparison: not compared with previous ECG   Previous ECG: no previous ECG available  Rhythm: sinus rhythm  Rate: normal  BPM: 67  Conduction: conduction normal  ST Segments: ST segments normal  T Waves: T waves normal  QRS axis: normal    Clinical impression: normal ECG            Assessment and Plan    Diagnoses and all orders for this visit:    1. Preop examination (Primary)  -     XR Chest PA & Lateral;  Future  -     Hemoglobin A1c  -     CBC & Differential  -     Comprehensive Metabolic Panel  -     Protime-INR  -     ECG 12 Lead    2. Prediabetes  -     Hemoglobin A1c  -     CBC & Differential  -     Comprehensive Metabolic Panel  -     Protime-INR    3. Great toe pain, right  -     Hemoglobin A1c  -     CBC & Differential  -     Comprehensive Metabolic Panel  -     Protime-INR    Revised cardiac risk index  Estimated risk of adverse outcome with noncardiac surgery: Very low risk  Estimated rate of myocardial infarction, pulmonary edema, ventricular fibrillation, cardiac arrest, or complete heart block: 0.4%    Addendum  CXR revealed mild cardiomegaly. Will place referral to cardiology for cardiac clearance.       Follow Up   Return in about 6 months (around 8/7/2024) for Medicare Wellness.  Patient was given instructions and counseling regarding her condition or for health maintenance advice. Please see specific information pulled into the AVS if appropriate.

## 2024-02-08 DIAGNOSIS — I51.7 CARDIOMEGALY: Primary | ICD-10-CM

## 2024-02-09 ENCOUNTER — OFFICE VISIT (OUTPATIENT)
Dept: FAMILY MEDICINE CLINIC | Facility: CLINIC | Age: 69
End: 2024-02-09
Payer: MEDICARE

## 2024-02-09 VITALS
OXYGEN SATURATION: 98 % | SYSTOLIC BLOOD PRESSURE: 142 MMHG | BODY MASS INDEX: 33.05 KG/M2 | HEART RATE: 84 BPM | HEIGHT: 62 IN | DIASTOLIC BLOOD PRESSURE: 76 MMHG | TEMPERATURE: 97.8 F | RESPIRATION RATE: 16 BRPM | WEIGHT: 179.6 LBS

## 2024-02-09 DIAGNOSIS — I51.7 CARDIOMEGALY: Primary | ICD-10-CM

## 2024-02-09 DIAGNOSIS — R73.03 PREDIABETES: ICD-10-CM

## 2024-02-09 PROCEDURE — 99214 OFFICE O/P EST MOD 30 MIN: CPT | Performed by: FAMILY MEDICINE

## 2024-02-09 PROCEDURE — 1159F MED LIST DOCD IN RCRD: CPT | Performed by: FAMILY MEDICINE

## 2024-02-09 PROCEDURE — 1160F RVW MEDS BY RX/DR IN RCRD: CPT | Performed by: FAMILY MEDICINE

## 2024-02-09 RX ORDER — METFORMIN HYDROCHLORIDE 500 MG/1
500 TABLET, EXTENDED RELEASE ORAL
Qty: 90 TABLET | Refills: 1 | Status: SHIPPED | OUTPATIENT
Start: 2024-02-09

## 2024-02-09 NOTE — PROGRESS NOTES
Chief Complaint  Discuss Results (Discuss heart findings.)    Subjective          Heather Olmstead presents to [facility]  History of Present Illness  Recently completed preop exam. CXR revealed mild cardiomegaly compared to CXR May 2023.   Over the last 2 years, she has had increased shortness of breath.   When she plays golf or walking up her stairs at home has noticed that she gets winded.   Denies edema    Labs revealed prediabetes. She has had weight gain over the years, difficult to lose.   Tried keto diet, fasting without any loss     Answers submitted by the patient for this visit:  Other (Submitted on 2/8/2024)  Please describe your symptoms.: Go over blood test results and chest xray  Have you had these symptoms before?: No  How long have you been having these symptoms?: 1-4 days  Primary Reason for Visit (Submitted on 2/8/2024)  What is the primary reason for your visit?: Other    The following portions of the patient's history were reviewed and updated as appropriate: allergies, current medications, past family history, past medical history, past social history, past surgical history, and problem list.    Objective      Physical Exam  Vitals reviewed.   Cardiovascular:      Rate and Rhythm: Normal rate.      Heart sounds: Normal heart sounds.   Pulmonary:      Effort: Pulmonary effort is normal.      Breath sounds: Normal breath sounds.   Neurological:      Mental Status: She is alert.        Result Review :   The following data was reviewed by: Diana Montgomery DO on 02/09/2024:  CMP          2/7/2024    10:44   CMP   Glucose 87    BUN 13    Creatinine 0.95    Sodium 143    Potassium 5.0    Chloride 105    Calcium 9.3    Total Protein 6.9    Albumin 4.2    Globulin 2.7    Total Bilirubin 0.4    Alkaline Phosphatase 93    AST (SGOT) 33    ALT (SGPT) 39    BUN/Creatinine Ratio 13.7      CBC w/diff          2/7/2024    10:44   CBC w/Diff   WBC 7.78    RBC 4.94     Hemoglobin 14.0    Hematocrit 43.2    MCV 87.4    MCH 28.3    MCHC 32.4    RDW 13.8    Platelets 288    Neutrophil Rel % 61.3    Lymphocyte Rel % 22.0    Monocyte Rel % 11.2    Eosinophil Rel % 3.7    Basophil Rel % 1.0      Most Recent A1C          2/7/2024    10:44   HGBA1C Most Recent   Hemoglobin A1C 6.20               Assessment and Plan    Diagnoses and all orders for this visit:    1. Cardiomegaly (Primary)  Comments:  Seen on CXR, referral placed to cardiology for pre-op clearance    2. Prediabetes  -     metFORMIN ER (GLUCOPHAGE-XR) 500 MG 24 hr tablet; Take 1 tablet by mouth Daily With Breakfast.  Dispense: 90 tablet; Refill: 1    Start metformin for treatment for prediabetes. She is aware to hold metformin at least 48 hours prior to surgical procedure, once it is scheduled.       Follow Up   Return in about 3 months (around 5/9/2024) for Recheck.  Patient was given instructions and counseling regarding her condition or for health maintenance advice. Please see specific information pulled into the AVS if appropriate.

## 2024-02-14 ENCOUNTER — OFFICE VISIT (OUTPATIENT)
Dept: OBSTETRICS AND GYNECOLOGY | Facility: CLINIC | Age: 69
End: 2024-02-14
Payer: MEDICARE

## 2024-02-14 VITALS
WEIGHT: 178.4 LBS | HEIGHT: 62 IN | BODY MASS INDEX: 32.83 KG/M2 | SYSTOLIC BLOOD PRESSURE: 128 MMHG | DIASTOLIC BLOOD PRESSURE: 80 MMHG

## 2024-02-14 DIAGNOSIS — Z01.419 WOMEN'S ANNUAL ROUTINE GYNECOLOGICAL EXAMINATION: Primary | ICD-10-CM

## 2024-02-14 DIAGNOSIS — N95.8 OTHER SPECIFIED MENOPAUSAL AND PERIMENOPAUSAL DISORDERS: ICD-10-CM

## 2024-02-14 RX ORDER — IBUPROFEN 800 MG/1
TABLET ORAL
COMMUNITY
Start: 2024-02-13

## 2024-02-14 RX ORDER — AMOXICILLIN 500 MG/1
CAPSULE ORAL
COMMUNITY
Start: 2024-02-13

## 2024-02-19 LAB — REF LAB TEST METHOD: NORMAL

## 2024-03-06 NOTE — PROGRESS NOTES
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 [place]    PROBLEM LIST:   Preop cardiac evaluation  Cardiomegaly on CXR  HLD, aortic calcifications on CXR 2018  10/22  [phone]  Insulin resistance, on metformin  2/24 A1c 6.2  Surgical history:  Ankle surgery  Appendectomy   Breast reduction  Colon surgery for SBO      CC:  Chief Complaint   Patient presents with    cardiomegaly        Allergies  Allergies   Allergen Reactions    Sulfa Antibiotics Nausea Only       Current Medications  Current Outpatient Medications   Medication Instructions    Azelastine HCl 137 MCG/SPRAY solution USE 1 SPRAY(S) IN EACH NOSTRIL TWICE DAILY    Cholecalciferol (Vitamin D3) 125 MCG (5000 UT) chewable tablet Oral    coenzyme Q10 100 mg, Oral, Daily    diclofenac (VOLTAREN) 75 mg, Oral, 2 Times Daily    ELDERBERRY PO Oral    fluocinonide (LIDEX) 0.05 % external solution Topical    fluticasone (FLONASE) 50 MCG/ACT nasal spray Nasal, Daily    Ginkgo 60 MG tablet Oral    lactase (LACTAID) 3,000 Units, Oral, 3 Times Daily With Meals    MAGNESIUM LACTATE PO Oral    metFORMIN ER (GLUCOPHAGE-XR) 500 mg, Oral, Daily With Breakfast    methocarbamol (ROBAXIN) 750 mg, Oral, 4 Times Daily    Tildrakizumab-asmn (IIUMYA) 100 mg, Subcutaneous, Once, 4 times a year for psoriasis         History of Present Illness   HPI  Heather Olmstead is a 68 y.o. year old female with the above mentioned PMH who presents for consult from Diana Montgomery DO for evaluation of mild cardiomegaly seen on CXR. Patient needs preoperative cardiac evaluation prior to scheduling cheilectomy with BioPoly implant (toe surgery) with Dr. Gladis Christianson.  She was told that she had \"mitral valve issues\" for years previously in [place]  Pt " "denies any chest pain, dyspnea at rest, dyspnea on exertion, orthopnea, PND, palpitations, lower extremity edema, or claudication. Pt denies history of CHF, DVT, PE, MI, CVA, TIA, or rheumatic fever.       ROS  Review of Systems   Musculoskeletal:  Positive for joint pain.   All other systems reviewed and are negative.      SOCIAL HX  Social History     Socioeconomic History    Marital status:    Tobacco Use    Smoking status: Never    Smokeless tobacco: Never   Vaping Use    Vaping status: Never Used   Substance and Sexual Activity    Alcohol use: Yes     Comment: Very few    Drug use: Never    Sexual activity: Not Currently     Partners: Male     Birth control/protection: Tubal ligation       FAMILY HX  Family History   Problem Relation Age of Onset    Hypertension Mother     Arthritis Mother     Hyperlipidemia Mother     Thyroid disease Mother     Vision loss Mother         Macular degeneration    Diabetes Father     Hyperlipidemia Father     Cirrhosis Father     Hearing loss Father     Diabetes Sister     Hyperlipidemia Sister     Hyperlipidemia Brother     Diabetes Brother     Hyperlipidemia Brother        Vitals:    03/07/24 1436   BP: 140/96   BP Location: Left arm   Patient Position: Sitting   Cuff Size: Adult   Pulse: 82   SpO2: 95%   Weight: 79.8 kg (176 lb)   Height: 157.5 cm (62\")     Body mass index is 32.19 kg/m².     PHYSICAL EXAMINATION:  Constitutional:       Appearance: Healthy appearance. Not in distress.   Neck:      Vascular: No JVR. JVD normal.   Pulmonary:      Effort: Pulmonary effort is normal.      Breath sounds: Normal breath sounds. No wheezing. No rhonchi. No rales.   Chest:      Chest wall: Not tender to palpatation.   Cardiovascular:      PMI at left midclavicular line. Normal rate. Regular rhythm. Normal S1. Normal S2.       Murmurs: There is no murmur.      No gallop.  No click. No rub.   Pulses:     Intact distal pulses.   Edema:     Peripheral edema absent.   Abdominal:      " General: Bowel sounds are normal.      Palpations: Abdomen is soft.      Tenderness: There is no abdominal tenderness.   Musculoskeletal: Normal range of motion.         General: No tenderness. Skin:     General: Skin is warm and dry.   Neurological:      General: No focal deficit present.      Mental Status: Alert and oriented to person, place and time.         Diagnostic Data:  Procedures  Lab Results   Component Value Date    CHLPL 222 (H) 10/27/2022    TRIG 122 10/27/2022    HDL 58 10/27/2022     (H) 10/27/2022      Lab Results   Component Value Date    GLUCOSE 87 02/07/2024    CALCIUM 9.3 02/07/2024     02/07/2024    K 5.0 02/07/2024    CO2 25.5 02/07/2024     02/07/2024    BUN 13 02/07/2024    CREATININE 0.95 02/07/2024    EGFRRESULT 65.0 02/07/2024    BCR 13.7 02/07/2024      Lab Results   Component Value Date    WBC 7.78 02/07/2024    HGB 14.0 02/07/2024    HCT 43.2 02/07/2024    MCV 87.4 02/07/2024     02/07/2024     Lab Results   Component Value Date    HGBA1C 6.20 (H) 02/07/2024      Lab Results   Component Value Date    TSH 2.470 05/03/2023          Advance Care Planning   ACP discussion was held with the patient during this visit. Patient has an advance directive in EMR which is still valid.          ASSESSMENT:     Diagnosis Plan   1. Cardiomegaly  Adult Transthoracic Echo Complete W/ Cont if Necessary Per Protocol          PLAN:    Cardiomegaly incidentally found on chest x-ray.  Would document echocardiogram for systolic diastolic pressures and pulmonary pressures    Insulin resistance recommended trial of intermittent fasting      Diana Montgomery DO, thank you for referring Ms. Olmstead for evaluation.  I have forwarded my electronically generated recommendations to you for review.  Please do not hesitate to call with any questions.      John Hilario MD, [facility]

## 2024-03-07 ENCOUNTER — TELEPHONE (OUTPATIENT)
Dept: OBSTETRICS AND GYNECOLOGY | Facility: CLINIC | Age: 69
End: 2024-03-07
Payer: MEDICARE

## 2024-03-07 ENCOUNTER — OFFICE VISIT (OUTPATIENT)
Dept: CARDIOLOGY | Facility: CLINIC | Age: 69
End: 2024-03-07
Payer: MEDICARE

## 2024-03-07 VITALS
SYSTOLIC BLOOD PRESSURE: 140 MMHG | WEIGHT: 176 LBS | OXYGEN SATURATION: 95 % | DIASTOLIC BLOOD PRESSURE: 96 MMHG | HEART RATE: 82 BPM | HEIGHT: 62 IN | BODY MASS INDEX: 32.39 KG/M2

## 2024-03-07 DIAGNOSIS — I51.7 CARDIOMEGALY: Primary | ICD-10-CM

## 2024-03-07 PROCEDURE — 99203 OFFICE O/P NEW LOW 30 MIN: CPT | Performed by: INTERNAL MEDICINE

## 2024-03-07 RX ORDER — UBIDECARENONE 100 MG
100 CAPSULE ORAL DAILY
COMMUNITY

## 2024-03-07 RX ORDER — GINKGO BILOBA 60 MG
TABLET ORAL
COMMUNITY

## 2024-03-07 RX ORDER — CHOLECALCIFEROL (VITAMIN D3) 125 MCG
3000 CAPSULE ORAL
COMMUNITY

## 2024-03-07 NOTE — TELEPHONE ENCOUNTER
I called the pt to let her know her BDS is mostly stable since her BDS 2020. All her other BDS tests have been at [facility] and are in her chart.     She has already had her annual this year with Dr. Cruz and came back to have her BDS. Previous ankle fracture.     She sees a naturopathic dr who treats her bones and she is not interested in going to [facility] or being prescribed Actonel, Fosamax ect. This report says repeat in 1 year, so I will need to confirm her bones are not worsening and call her back. She agrees with this plan.

## 2024-03-12 ENCOUNTER — TELEPHONE (OUTPATIENT)
Dept: OBSTETRICS AND GYNECOLOGY | Facility: CLINIC | Age: 69
End: 2024-03-12
Payer: MEDICARE

## 2024-03-12 NOTE — TELEPHONE ENCOUNTER
I called the pt after I talked to Dr. Cruz Bone Density Scan Findings     Consistent with Osteopenia and Osteoporosis     Would recommend Calcium , Vitamin D, Weight Bearing Exercises, and consideration of medical therapy     Follow Up Repeat Study in 2 Years      Heather Cruz MD. Copy mailed to pt per request.

## 2024-03-13 ENCOUNTER — TELEPHONE (OUTPATIENT)
Dept: FAMILY MEDICINE CLINIC | Facility: CLINIC | Age: 69
End: 2024-03-13
Payer: MEDICARE

## 2024-03-13 DIAGNOSIS — Z11.1 SCREENING FOR TUBERCULOSIS: Primary | ICD-10-CM

## 2024-03-18 ENCOUNTER — LAB (OUTPATIENT)
Dept: FAMILY MEDICINE CLINIC | Facility: CLINIC | Age: 69
End: 2024-03-18
Payer: MEDICARE

## 2024-03-18 ENCOUNTER — HOSPITAL ENCOUNTER (OUTPATIENT)
Dept: CARDIOLOGY | Facility: HOSPITAL | Age: 69
Discharge: HOME | End: 2024-03-18
Admitting: INTERNAL MEDICINE
Payer: MEDICARE

## 2024-03-18 VITALS — WEIGHT: 175.93 LBS | BODY MASS INDEX: 32.37 KG/M2 | HEIGHT: 62 IN

## 2024-03-18 DIAGNOSIS — I51.7 CARDIOMEGALY: ICD-10-CM

## 2024-03-18 LAB
BH CV ECHO MEAS - AO MAX PG: 4.7 MMHG
BH CV ECHO MEAS - AO MEAN PG: 2.31 MMHG
BH CV ECHO MEAS - AO ROOT DIAM: 2.6 CM
BH CV ECHO MEAS - AO V2 MAX: 108.6 CM/SEC
BH CV ECHO MEAS - AO V2 VTI: 23 CM
BH CV ECHO MEAS - AVA(I,D): 2.25 CM2
BH CV ECHO MEAS - EDV(CUBED): 113 ML
BH CV ECHO MEAS - EDV(MOD-SP2): 62 ML
BH CV ECHO MEAS - EDV(MOD-SP4): 85 ML
BH CV ECHO MEAS - EF(MOD-BP): 54 %
BH CV ECHO MEAS - EF(MOD-SP2): 53.2 %
BH CV ECHO MEAS - EF(MOD-SP4): 54.1 %
BH CV ECHO MEAS - ESV(CUBED): 43.1 ML
BH CV ECHO MEAS - ESV(MOD-SP2): 29 ML
BH CV ECHO MEAS - ESV(MOD-SP4): 39 ML
BH CV ECHO MEAS - FS: 27.5 %
BH CV ECHO MEAS - IVS/LVPW: 1.28 CM
BH CV ECHO MEAS - IVSD: 1.02 CM
BH CV ECHO MEAS - LA DIMENSION: 3.8 CM
BH CV ECHO MEAS - LV DIASTOLIC VOL/BSA (35-75): 46.9 CM2
BH CV ECHO MEAS - LV MASS(C)D: 152.3 GRAMS
BH CV ECHO MEAS - LV MAX PG: 3.3 MMHG
BH CV ECHO MEAS - LV MEAN PG: 1.97 MMHG
BH CV ECHO MEAS - LV SYSTOLIC VOL/BSA (12-30): 21.5 CM2
BH CV ECHO MEAS - LV V1 MAX: 90.3 CM/SEC
BH CV ECHO MEAS - LV V1 VTI: 23.2 CM
BH CV ECHO MEAS - LVIDD: 4.8 CM
BH CV ECHO MEAS - LVIDS: 3.5 CM
BH CV ECHO MEAS - LVOT AREA: 2.24 CM2
BH CV ECHO MEAS - LVOT DIAM: 1.69 CM
BH CV ECHO MEAS - LVPWD: 0.8 CM
BH CV ECHO MEAS - MV A MAX VEL: 103.2 CM/SEC
BH CV ECHO MEAS - MV DEC TIME: 0.28 SEC
BH CV ECHO MEAS - MV E MAX VEL: 85.9 CM/SEC
BH CV ECHO MEAS - MV E/A: 0.83
BH CV ECHO MEAS - PA ACC SLOPE: 736 CM/SEC2
BH CV ECHO MEAS - PA ACC TIME: 0.07 SEC
BH CV ECHO MEAS - PI END-D VEL: 82.4 CM/SEC
BH CV ECHO MEAS - RAP SYSTOLE: 3 MMHG
BH CV ECHO MEAS - RVSP: 28 MMHG
BH CV ECHO MEAS - SI(MOD-SP2): 18.2 ML/M2
BH CV ECHO MEAS - SI(MOD-SP4): 25.4 ML/M2
BH CV ECHO MEAS - SV(LVOT): 51.9 ML
BH CV ECHO MEAS - SV(MOD-SP2): 33 ML
BH CV ECHO MEAS - SV(MOD-SP4): 46 ML
BH CV ECHO MEAS - TAPSE (>1.6): 1.6 CM
BH CV ECHO MEAS - TR MAX PG: 25 MMHG
BH CV ECHO MEAS - TR MAX VEL: 237.5 CM/SEC
BH CV VAS BP RIGHT ARM: NORMAL MMHG
BH CV XLRA - RV BASE: 3.4 CM
BH CV XLRA - RV LENGTH: 6.2 CM
BH CV XLRA - RV MID: 2.6 CM
BH CV XLRA - TDI S': 10.4 CM/SEC
LEFT ATRIUM VOLUME INDEX: 25.4 ML/M2

## 2024-03-18 PROCEDURE — 93306 TTE W/DOPPLER COMPLETE: CPT

## 2024-03-18 PROCEDURE — 93306 TTE W/DOPPLER COMPLETE: CPT | Performed by: INTERNAL MEDICINE

## 2024-03-22 ENCOUNTER — TELEPHONE (OUTPATIENT)
Dept: FAMILY MEDICINE CLINIC | Facility: CLINIC | Age: 69
End: 2024-03-22
Payer: MEDICARE

## 2024-03-22 ENCOUNTER — TELEPHONE (OUTPATIENT)
Dept: CARDIOLOGY | Facility: CLINIC | Age: 69
End: 2024-03-22
Payer: MEDICARE

## 2024-03-22 NOTE — TELEPHONE ENCOUNTER
Informed PT her cardiac clearance needs to come from her cardiologist. She expressed understanding. Let her know to contact again if she needs results from our office.

## 2024-03-22 NOTE — TELEPHONE ENCOUNTER
Caller: Heather Olmstead    Relationship: Self    Best call back number: [phone]     What is the best time to reach you: ANY    Who are you requesting to speak with (clinical staff, provider,  specific staff member): CLINICAL STAFF    Do you know the name of the person who called: HEATHER    What was the call regarding: PATIENT STATED THAT [facility] IS WAITING FOR THE RESULTS OF SEVERAL DIFFERENT TESTS TO BE SENT TO THEIR OFFICE IN ORDER TO SCHEDULE HER SURGERY.    SHE SPOKE WITH THEIR OFFICE TODAY AND THEY DO NOT HAVE ANY REPORTS FROM [place] FOR THE TESTING THAT WAS DONE.    SHE STATED THAT SHE CALLED THE CARDIOLOGY DEPARTMENT TO INQUIRE ABOUT THE TESTS THAT THEY RAN BUT SHE HAD TO LEAVE A VOICEMAIL.    IS THERE ANYTHING ELSE THE PATIENT SHOULD BE DOING OR ARE WE STILL NEEDING TO SEND THE RESULTS TO [facility]?    PLEASE ADVISE PATIENT.

## 2024-03-22 NOTE — TELEPHONE ENCOUNTER
Patient left voice mail message asking if her ortho paperwork has been sent to [facility] so she can have surgery on toe.    Spoke with patient.  I do not see cardiac clearance in the patients chart.  We may be waiting on her echocardiogram results.  I will discuss with Dr. Hilario and get back with her.

## 2024-03-27 ENCOUNTER — TELEPHONE (OUTPATIENT)
Dept: FAMILY MEDICINE CLINIC | Facility: CLINIC | Age: 69
End: 2024-03-27
Payer: MEDICARE

## 2024-03-27 NOTE — TELEPHONE ENCOUNTER
Caller: Heather Olmstead    Relationship: Self    Best call back number:[phone]    What form or medical record are you requesting: PAPERWORK FOR SURGERY ALFA ON HER TOE     Who is requesting this form or medical record from you: PATIENT    How would you like to receive the form or medical records (pick-up, mail, fax): FAX TO [facility]     Timeframe paperwork needed: AS SOON AS POSSIBLE     Additional notes: THE PATIENT WOULD LIKE TO KNOW IF THAT WAS DONE

## 2024-04-01 NOTE — TELEPHONE ENCOUNTER
Spoke with pt, let her know we would fax over the clearance to [facility].    Looks like we faxed on 2/8/24, faxed again 4/1/24 to [phone].   No

## 2024-04-01 NOTE — TELEPHONE ENCOUNTER
PATIENT IS CALLING TO SPEAK WITH THE CLINICAL STAFF ABOUT GETTING CLEARANCE FOR THIS SURGERY. SHE SAYS THAT THE SURGERY TEAM HAS NOT HEARD FROM HER PCP.

## 2024-04-04 ENCOUNTER — OFFICE VISIT (OUTPATIENT)
Dept: FAMILY MEDICINE CLINIC | Facility: CLINIC | Age: 69
End: 2024-04-04
Payer: MEDICARE

## 2024-04-04 ENCOUNTER — TELEPHONE (OUTPATIENT)
Dept: FAMILY MEDICINE CLINIC | Facility: CLINIC | Age: 69
End: 2024-04-04
Payer: MEDICARE

## 2024-04-04 VITALS
WEIGHT: 174.6 LBS | TEMPERATURE: 97.3 F | DIASTOLIC BLOOD PRESSURE: 84 MMHG | BODY MASS INDEX: 32.13 KG/M2 | HEIGHT: 62 IN | OXYGEN SATURATION: 98 % | HEART RATE: 66 BPM | SYSTOLIC BLOOD PRESSURE: 154 MMHG | RESPIRATION RATE: 18 BRPM

## 2024-04-04 DIAGNOSIS — R76.8 POSITIVE ANA (ANTINUCLEAR ANTIBODY): ICD-10-CM

## 2024-04-04 DIAGNOSIS — M79.89 SWELLING OF RIGHT HAND: ICD-10-CM

## 2024-04-04 DIAGNOSIS — M79.641 PAIN OF RIGHT HAND: Primary | ICD-10-CM

## 2024-04-04 PROCEDURE — 99214 OFFICE O/P EST MOD 30 MIN: CPT | Performed by: PHYSICIAN ASSISTANT

## 2024-04-04 RX ORDER — PREDNISONE 10 MG/1
TABLET ORAL
Qty: 21 EACH | Refills: 0 | Status: SHIPPED | OUTPATIENT
Start: 2024-04-04

## 2024-04-04 NOTE — TELEPHONE ENCOUNTER
Caller: Heather Olmstead    Relationship: Self    Best call back number: [phone]     What is the best time to reach you: ANY    Who are you requesting to speak with (clinical staff, provider,  specific staff member): NURSE    Do you know the name of the person who called: PATIENT    What was the call regarding: PATIENT WAITING ON SURGICAL CLEARANCE FOR [facility].  THEY RECEIVED THE FAX BUT SOMETHING WASN'T MARKED CORRECTLY TO GIVE CLEARANCE.      Is it okay if the provider responds through MyChart: PHONE CALL PLEASE

## 2024-04-04 NOTE — PROGRESS NOTES
"Renetta Olmstead is a 68 y.o. female.     History of Present Illness   Patient of Dr. Silva who presents with CC of right hand pain and swelling X 2 months. No recent injury or trauma.   Saw patient for similar issue in May 2023 in which XR of hand was obtained (impressions below). Positive CATE on labs with negative RA screening. Due to XR findings and symptoms, patient was referred to rheumatology but was unable to make appointment    Impression:  1.No displaced fracture or dislocation.  2.Evidence for diffuse osteopenia/osteoporosis.  3.Mild changes of arthropathy are noted with evidence for potential erosive component. The findings may indicate underlying inflammatory acropathy or rheumatoid arthritis. Mild productive type changes are also noted.    The following portions of the patient's history were reviewed and updated as appropriate: allergies, current medications, past family history, past medical history, past social history, past surgical history, and problem list.    Review of Systems  As noted per HPI     Objective   Blood pressure 154/84, pulse 66, temperature 97.3 °F (36.3 °C), resp. rate 18, height 157.5 cm (62\"), weight 79.2 kg (174 lb 9.6 oz), SpO2 98%, not currently breastfeeding.     Physical Exam  Constitutional:       Appearance: Normal appearance.   Cardiovascular:      Rate and Rhythm: Normal rate and regular rhythm.   Pulmonary:      Effort: Pulmonary effort is normal.      Breath sounds: Normal breath sounds.   Neurological:      Mental Status: She is alert and oriented to person, place, and time.   Psychiatric:         Mood and Affect: Mood normal.         Behavior: Behavior normal.       Inflammation of dorsum of right hand as imaged above     Assessment & Plan   Diagnoses and all orders for this visit:    1. Pain of right hand (Primary)  -     Ambulatory Referral to Orthopedic Surgery  -     Ambulatory Referral to Rheumatology  -     predniSONE (DELTASONE) 10 MG (21) dose " pack; Use as directed on package  Dispense: 21 each; Refill: 0    2. Swelling of right hand  -     Ambulatory Referral to Orthopedic Surgery  -     Ambulatory Referral to Rheumatology  -     predniSONE (DELTASONE) 10 MG (21) dose pack; Use as directed on package  Dispense: 21 each; Refill: 0    3. Positive CATE (antinuclear antibody)  -     Ambulatory Referral to Rheumatology    Per XR findings from last year and symptoms, findings concerning for RA type joint concern  Steroid taper for symptoms and will work on getting patient in with ortho and rheumatology

## 2024-05-08 ENCOUNTER — OFFICE VISIT (OUTPATIENT)
Dept: FAMILY MEDICINE CLINIC | Facility: CLINIC | Age: 69
End: 2024-05-08
Payer: MEDICARE

## 2024-05-08 VITALS
TEMPERATURE: 97.5 F | DIASTOLIC BLOOD PRESSURE: 70 MMHG | BODY MASS INDEX: 31.58 KG/M2 | SYSTOLIC BLOOD PRESSURE: 130 MMHG | RESPIRATION RATE: 18 BRPM | OXYGEN SATURATION: 96 % | HEIGHT: 62 IN | WEIGHT: 171.6 LBS | HEART RATE: 78 BPM

## 2024-05-08 DIAGNOSIS — Z00.00 MEDICARE ANNUAL WELLNESS VISIT, SUBSEQUENT: Primary | ICD-10-CM

## 2024-05-08 DIAGNOSIS — R74.01 ELEVATION OF LEVELS OF LIVER TRANSAMINASE LEVELS: ICD-10-CM

## 2024-05-08 DIAGNOSIS — R73.03 PREDIABETES: ICD-10-CM

## 2024-05-08 DIAGNOSIS — K21.9 GASTROESOPHAGEAL REFLUX DISEASE, UNSPECIFIED WHETHER ESOPHAGITIS PRESENT: ICD-10-CM

## 2024-05-08 DIAGNOSIS — E66.9 OBESITY (BMI 30.0-34.9): ICD-10-CM

## 2024-05-08 DIAGNOSIS — R22.1 NECK MASS: ICD-10-CM

## 2024-05-08 PROCEDURE — 1125F AMNT PAIN NOTED PAIN PRSNT: CPT | Performed by: FAMILY MEDICINE

## 2024-05-08 PROCEDURE — 1160F RVW MEDS BY RX/DR IN RCRD: CPT | Performed by: FAMILY MEDICINE

## 2024-05-08 PROCEDURE — 99214 OFFICE O/P EST MOD 30 MIN: CPT | Performed by: FAMILY MEDICINE

## 2024-05-08 PROCEDURE — G0439 PPPS, SUBSEQ VISIT: HCPCS | Performed by: FAMILY MEDICINE

## 2024-05-08 PROCEDURE — 1170F FXNL STATUS ASSESSED: CPT | Performed by: FAMILY MEDICINE

## 2024-05-08 PROCEDURE — 3288F FALL RISK ASSESSMENT DOCD: CPT | Performed by: FAMILY MEDICINE

## 2024-05-08 PROCEDURE — 1159F MED LIST DOCD IN RCRD: CPT | Performed by: FAMILY MEDICINE

## 2024-05-08 RX ORDER — OMEPRAZOLE 20 MG/1
20 CAPSULE, DELAYED RELEASE ORAL DAILY
COMMUNITY
Start: 2024-04-17

## 2024-05-08 RX ORDER — METHOTREXATE 2.5 MG/1
2.5 TABLET ORAL
COMMUNITY
Start: 2024-04-17

## 2024-05-08 RX ORDER — FOLIC ACID 1 MG/1
1 TABLET ORAL DAILY
COMMUNITY
Start: 2024-04-17

## 2024-05-08 RX ORDER — METFORMIN HYDROCHLORIDE 500 MG/1
500 TABLET, EXTENDED RELEASE ORAL
Qty: 90 TABLET | Refills: 1 | Status: SHIPPED | OUTPATIENT
Start: 2024-05-08

## 2024-05-21 ENCOUNTER — HOSPITAL ENCOUNTER (OUTPATIENT)
Dept: ULTRASOUND IMAGING | Facility: HOSPITAL | Age: 69
Discharge: HOME | End: 2024-05-21
Admitting: FAMILY MEDICINE
Payer: MEDICARE

## 2024-05-21 ENCOUNTER — LAB (OUTPATIENT)
Dept: FAMILY MEDICINE CLINIC | Facility: CLINIC | Age: 69
End: 2024-05-21
Payer: MEDICARE

## 2024-05-21 DIAGNOSIS — R74.01 ELEVATION OF LEVELS OF LIVER TRANSAMINASE LEVELS: ICD-10-CM

## 2024-05-21 DIAGNOSIS — R22.1 NECK MASS: ICD-10-CM

## 2024-05-21 PROCEDURE — 76536 US EXAM OF HEAD AND NECK: CPT

## 2024-05-22 LAB
ALBUMIN SERPL-MCNC: 4.2 G/DL (ref 3.9–4.9)
ALBUMIN/GLOB SERPL: 1.5 {RATIO} (ref 1.2–2.2)
ALP SERPL-CCNC: 99 IU/L (ref 44–121)
ALT SERPL-CCNC: 30 IU/L (ref 0–32)
AST SERPL-CCNC: 27 IU/L (ref 0–40)
BILIRUB SERPL-MCNC: 0.3 MG/DL (ref 0–1.2)
BUN SERPL-MCNC: 13 MG/DL (ref 8–27)
BUN/CREAT SERPL: 16 (ref 12–28)
CALCIUM SERPL-MCNC: 9.7 MG/DL (ref 8.7–10.3)
CHLORIDE SERPL-SCNC: 106 MMOL/L (ref 96–106)
CO2 SERPL-SCNC: 24 MMOL/L (ref 20–29)
CREAT SERPL-MCNC: 0.81 MG/DL (ref 0.57–1)
EGFRCR SERPLBLD CKD-EPI 2021: 79 ML/MIN/1.73
GLOBULIN SER CALC-MCNC: 2.8 G/DL (ref 1.5–4.5)
GLUCOSE SERPL-MCNC: 100 MG/DL (ref 70–99)
HAV IGM SERPL QL IA: NEGATIVE
HBV CORE IGM SERPL QL IA: NEGATIVE
HBV SURFACE AG SERPL QL IA: NEGATIVE
HCV AB SERPL QL IA: NORMAL
HCV IGG SERPL QL IA: NON REACTIVE
POTASSIUM SERPL-SCNC: 5 MMOL/L (ref 3.5–5.2)
PROT SERPL-MCNC: 7 G/DL (ref 6–8.5)
SODIUM SERPL-SCNC: 143 MMOL/L (ref 134–144)

## 2024-06-24 ENCOUNTER — OFFICE VISIT (OUTPATIENT)
Age: 69
End: 2024-06-24
Payer: MEDICARE

## 2024-06-24 VITALS
HEIGHT: 62 IN | SYSTOLIC BLOOD PRESSURE: 122 MMHG | WEIGHT: 173.4 LBS | HEART RATE: 86 BPM | DIASTOLIC BLOOD PRESSURE: 86 MMHG | TEMPERATURE: 97.1 F | BODY MASS INDEX: 31.91 KG/M2

## 2024-06-24 DIAGNOSIS — Z79.899 HIGH RISK MEDICATION USE: ICD-10-CM

## 2024-06-24 DIAGNOSIS — R76.8 ANA POSITIVE: ICD-10-CM

## 2024-06-24 DIAGNOSIS — Z79.899 IMMUNOSUPPRESSION DUE TO DRUG THERAPY: ICD-10-CM

## 2024-06-24 DIAGNOSIS — M15.9 GENERALIZED OSTEOARTHROSIS, INVOLVING MULTIPLE SITES: ICD-10-CM

## 2024-06-24 DIAGNOSIS — Z79.1 NSAID LONG-TERM USE: ICD-10-CM

## 2024-06-24 DIAGNOSIS — D84.821 IMMUNOSUPPRESSION DUE TO DRUG THERAPY: ICD-10-CM

## 2024-06-24 DIAGNOSIS — L40.50 PSORIATIC ARTHRITIS OF MULTIPLE JOINTS: Primary | ICD-10-CM

## 2024-06-24 DIAGNOSIS — L40.9 PSORIASIS: ICD-10-CM

## 2024-06-24 PROCEDURE — 1160F RVW MEDS BY RX/DR IN RCRD: CPT | Performed by: INTERNAL MEDICINE

## 2024-06-24 PROCEDURE — 1159F MED LIST DOCD IN RCRD: CPT | Performed by: INTERNAL MEDICINE

## 2024-06-24 PROCEDURE — G2211 COMPLEX E/M VISIT ADD ON: HCPCS | Performed by: INTERNAL MEDICINE

## 2024-06-24 PROCEDURE — 99214 OFFICE O/P EST MOD 30 MIN: CPT | Performed by: INTERNAL MEDICINE

## 2024-06-24 RX ORDER — METHOTREXATE 2.5 MG/1
15 TABLET ORAL WEEKLY
Qty: 72 TABLET | Refills: 0 | Status: SHIPPED | OUTPATIENT
Start: 2024-06-24

## 2024-06-24 RX ORDER — FOLIC ACID 1 MG/1
1000 TABLET ORAL DAILY
Qty: 90 TABLET | Refills: 3 | Status: SHIPPED | OUTPATIENT
Start: 2024-06-24

## 2024-06-24 NOTE — ASSESSMENT & PLAN NOTE
dermatology Dr Infante.  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

## 2024-06-24 NOTE — ASSESSMENT & PLAN NOTE
MTX  We discussed DMARD therapy including Plaquenil, Sulfasalazine,  Leflunomide, and MTX and the alternative of not being treated. MTX was chosen. Risks include but are not limited to severe liver damage that can be fatal, the possible need for liver biopsy, bone marrow suppression that can lead to dangerously low blood counts, GI side effects including mouth sores and diarrhea, fatigue, and rare risk of severe pulmonary complications. There should be no alcohol consumed with MTX. MTX can cause severe fetal abnormalities whether the mother or father is taking the medication and thus must be avoided if pregnancy is a possibility.  All medication is to be taken one day a week only. The need for q 8-12 week labs and the need for folic acid supplementation were discussed.

## 2024-06-24 NOTE — ASSESSMENT & PLAN NOTE
Labs 2/7/24: Normal CBC, CMP normal except for mild LFT elevation  Labs 5/3/23: Positive CATE direct, negative double-stranded DNA, negative rheumatoid factor, normal TSH and normal thyroid autoantibodies.    Suspect related to underlying psoriatic arthritis/psoriasis.  She has no clinical features of lupus or connective tissue disease    No Raynaud's, no malar rash, no oral nasal ulcers, no pleurisy/pericarditis, no seizure disorder, no renal or hematologic abnormality.  No clotting disorder.  No photosensitivity    An CATE test is a non specific test. While it certainly can be positive in conditions like SLE, scleroderma, myositis, Sjögren's, RA , vasculitis etc.. It can also be positive in patients with thyroid disease, type 1 diabetes, psoriasis, Celiac disease, inflammatory bowel disease, COPD/chronic lung disease, cancers, etc.. There are also reports of normal/apparently healthy individuals who are incidentally found to have a +CATE test. You can also frequently get a false positive CATE test.  Positive CATE results increase with age.  15% of patients over 65 years of age are CATE positive, along with 5% of the general population.  handout on positive CATE test given.

## 2024-06-24 NOTE — PROGRESS NOTES
Office Follow Up      Date: 06/24/2024   Patient Name: Heather Olmstead  MRN: [id number]  YOB: 1955    Referring Physician: Diana Montgomery DO     Chief Complaint:   Chief Complaint   Patient presents with    Osteoporosis    Psoriatic arthritis       History of Present Illness: Heather Olmstead is a 68 y.o. female  with history of psoriasis, osteoarthritis, chronic back pain, osteoporosis here for follow-up of psoriatic arthritis     She previously developed pain stiffness swelling in the right hand which started in 2023.  She started on methotrexate for psoriatic arthritis and right hand pain and swelling has resolved without recurrence.  She follows with dermatology Dr. Infante for psoriasis and previously has tried multiple therapies for psoriasis including Humira, Cosentyx, Otezla, Skyrizi.  She is currently on biologic therapy with Tildrakizumab since 2023 which has remarkably improved her scalp psoriasis.  She has seen Orthopedics Dr. Cunningham in regards to chronic back pain and was prescribed diclofenac and muscle relaxant.  The diclofenac has really upset her stomach /exacerbated her GERD , so lowered the dose to once daily    She denies Raynaud's, malar rash, photosensitivity, oral nasal ulcers, pleurisy/pericarditis, renal/hematologic abnormality, clotting disorder, seizure disorder, iritis, gout.    For the past few weeks she has struggled with right hip pain stiffness and catching.  She saw [facility] and had an x-ray of the hip that showed osteoarthritis in the right hip reportedly.  She had a x-ray guided steroid injection in the right hip just recently without improvement      Subjective       Review of Systems: Review of Systems   Constitutional:  Negative for chills, fatigue, fever and unexpected weight loss.   HENT:  Positive for postnasal drip. Negative for mouth sores, sinus pressure and sore throat.         Dry mouth  Nose sores   Eyes:  Negative  for pain and redness.        Dry eyes   Respiratory:  Positive for cough and wheezing. Negative for shortness of breath.    Cardiovascular:  Negative for chest pain.   Gastrointestinal:  Negative for abdominal pain, blood in stool, diarrhea, nausea, vomiting and GERD.   Endocrine: Negative for polydipsia and polyuria.   Genitourinary:  Positive for urinary incontinence. Negative for dysuria, genital sores and hematuria.   Musculoskeletal:  Positive for arthralgias. Negative for back pain, joint swelling, myalgias, neck pain and neck stiffness.   Skin:  Negative for rash and bruise.        Psoriasis  Photosensitvity  Malar rash   Allergic/Immunologic: Negative for immunocompromised state.   Neurological:  Negative for seizures, weakness, numbness and memory problem.   Hematological:  Negative for adenopathy. Does not bruise/bleed easily.   Psychiatric/Behavioral:  Negative for depressed mood. The patient is not nervous/anxious.         Medications:   Current Outpatient Medications:     Azelastine HCl 137 MCG/SPRAY solution, USE 1 SPRAY(S) IN EACH NOSTRIL TWICE DAILY, Disp: , Rfl:     Cholecalciferol (Vitamin D3) 125 MCG (5000 UT) chewable tablet, Chew., Disp: , Rfl:     coenzyme Q10 100 MG capsule, Take 1 capsule by mouth Daily., Disp: , Rfl:     diclofenac (VOLTAREN) 75 MG EC tablet, Take 1 tablet by mouth 2 (Two) Times a Day., Disp: , Rfl:     fluocinonide (LIDEX) 0.05 % external solution, Apply  topically to the appropriate area as directed., Disp: , Rfl:     fluticasone (FLONASE) 50 MCG/ACT nasal spray, into the nostril(s) as directed by provider Daily., Disp: , Rfl:     folic acid (FOLVITE) 1 MG tablet, Take 1 tablet by mouth Daily., Disp: 90 tablet, Rfl: 3    lactase (LACTAID) 3000 units tablet, Take 1 tablet by mouth 3 (Three) Times a Day With Meals., Disp: , Rfl:     MAGNESIUM LACTATE PO, Take  by mouth., Disp: , Rfl:     metFORMIN ER (GLUCOPHAGE-XR) 500 MG 24 hr tablet, Take 1 tablet by mouth Daily With  "Breakfast., Disp: 90 tablet, Rfl: 1    methocarbamol (ROBAXIN) 750 MG tablet, Take 1 tablet by mouth 4 (Four) Times a Day., Disp: , Rfl:     methotrexate 2.5 MG tablet, Take 6 tablets by mouth 1 (One) Time Per Week., Disp: 72 tablet, Rfl: 0    omeprazole (priLOSEC) 20 MG capsule, Take 1 capsule by mouth Daily., Disp: , Rfl:     Tildrakizumab-asmn (IIUMYA) 100 MG/ML injection, Inject 1 mL under the skin into the appropriate area as directed 1 (One) Time. 4 times a year for psoriasis, Disp: , Rfl:     Allergies:   Allergies   Allergen Reactions    Scopolamine Rash    Sulfa Antibiotics Nausea Only       I have reviewed and updated the patient's chief complaint, history of present illness, review of systems, past medical history, surgical history, family history, social history, medications and allergy list as appropriate.     Objective        Vital Signs:   Vitals:    06/24/24 1124   BP: 122/86   BP Location: Left arm   Patient Position: Sitting   Cuff Size: Adult   Pulse: 86   Temp: 97.1 °F (36.2 °C)   Weight: 78.7 kg (173 lb 6.4 oz)   Height: 157.5 cm (62.01\")   PainSc:   9   PainLoc: Hip  Comment: right     Body mass index is 31.71 kg/m².      Physical Exam:  Physical Exam   MUSCULOSKELETAL:   Heberden and Shayan's nodes scattered throughout the hands.  No peripheral synovitis.  No dactylitis.  No pitting of the nails  Tender decreased internal/external rotation right hip  Ambulates with a limp    Complete joint exam was performed including the MCPs, PIPs, DIPs of the hands, wrists, elbows, shoulders, hips, knees and ankles.  No soft tissue swelling or tenderness is present except as above.    General: The patient is well-developed and well nourished. Cooperative, alert and oriented. Affect is normal. Hydration appears normal.   HEENT: Normocephalic and atraumatic. No notable alopecia. Lids and conjunctiva are normal. Pupils are equal and sclera are clear. Oropharynx is clear   NECK neck is supple without " "adenopathy, masses or thyromegaly.   CARDIOVASCULAR: Regular rate and rhythm. No murmurs, rubs or gallops   LUNGS: Effort is normal. Lungs are clear bilateral   ABDOMEN: Not examined  EXTREMITIES: Peripheral pulses are intact. No clubbing.   SKIN: No rashes. No subcutaneous nodules. No digital ulcers. No sclerodactyly.   NEUROLOGIC: Gait is normal. Strength testing is normal.  No focal neurologic deficits    Results Review:   Labs:   Lab Results   Component Value Date    GLUCOSE 100 (H) 05/21/2024    BUN 13 05/21/2024    CREATININE 0.81 05/21/2024    EGFRRESULT 79 05/21/2024    BCR 16 05/21/2024    K 5.0 05/21/2024    CO2 24 05/21/2024    CALCIUM 9.7 05/21/2024    PROTENTOTREF 7.0 05/21/2024    ALBUMIN 4.2 05/21/2024    BILITOT 0.3 05/21/2024    AST 27 05/21/2024    ALT 30 05/21/2024     Lab Results   Component Value Date    WBC 7.78 02/07/2024    HGB 14.0 02/07/2024    HCT 43.2 02/07/2024    MCV 87.4 02/07/2024     02/07/2024     No results found for: \"SEDRATE\"  No results found for: \"CRP\"  Lab Results   Component Value Date    QUANTIFERO Incubation performed. 03/18/2024    QUANTIFERO Comment 03/18/2024    QUANTITB1 0.02 03/18/2024    QUANTITB2 0.01 03/18/2024    QUANTIFERN 0.00 03/18/2024    QUANTIFERM >10.00 03/18/2024    QUANTITBGLDP Negative 03/18/2024     No results found for: \"RF\"  Lab Results   Component Value Date    HEPBSAG Negative 05/21/2024    HEPAIGM Negative 05/21/2024    HEPBIGMCORE Negative 05/21/2024         Procedures    Assessment / Plan        Assessment & Plan  Psoriatic arthritis of multiple joints  + psoriasis scalp, +CATE, negative rheumatoid factor  Right hand MCP, 3rd finger pain swelling since 2023  *Current: Tildrakizumab since 2023 (Dr Infante dermatology), MTX 4/24(ACL), *diclofenac (dr Cunningham)  Prior Humira, Otezla, Cosentyx, Skyrizi per dermatologist.    Patient with known psoriasis scalp.  Rheumatoid factor negative.  CATE positive but no clinical features of lupus or connective " tissue disease.  She developed right hand pain and swelling in 2023 consistent with psoriatic arthritis    Low disease activity.  No swollen inflamed peripheral joints.  I believe her right hip pain is likely coming from osteoarthritis  Improved with resolution of right hand pain and swelling since addition of methotrexate 4/24  -Continue methotrexate 6 tablets once weekly.  Well-tolerated and effective  Continues on biologic therapy for psoriasis per Dermatology with Tildrakizumab since 2023 which has been highly effective for her scalp psoriasis    Recent labs reviewed 5/24 and are stable.    Standing order provided for labs CBC CMP sed rate CRP every 12 weeks on methotrexate next due 8/15/2024  Handout provided today on methotrexate and psoriatic arthritis.  Recommend she follow-up with orthopedics for right hip pain likely from osteoarthritis.  She was told by [facility] that she will likely need a right hip replacement if steroid injection right hip did not help  Continue diclofenac up to twice daily with PPI protection  She is moving to [place] in July 2024 and plans to establish with an orthopedist, dermatologist, rheumatologist there.  Return to clinic as needed if she is back in [place]  Psoriasis  dermatology Dr Infante.  Scalp psoriasis significantly improved on biologic therapy Tildrakizumab per Derm  High risk medication use  MTX  We discussed DMARD therapy including Plaquenil, Sulfasalazine,  Leflunomide, and MTX and the alternative of not being treated. MTX was chosen. Risks include but are not limited to severe liver damage that can be fatal, the possible need for liver biopsy, bone marrow suppression that can lead to dangerously low blood counts, GI side effects including mouth sores and diarrhea, fatigue, and rare risk of severe pulmonary complications. There should be no alcohol consumed with MTX. MTX can cause severe fetal abnormalities whether the mother or father is taking the  medication and thus must be avoided if pregnancy is a possibility.  All medication is to be taken one day a week only. The need for q 8-12 week labs and the need for folic acid supplementation were discussed.  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 Risks and alternatives were discussed at length and the option of no treatment was also given. We discussed risks including but not limited to infections which can be unusual, severe, and deadly. When possible, these agents should be stopped immediately if infections occur. Unusual infection such as TB and fungal infections can occur. There may be an increased risk of lymphoma with these agents. Other risks can include a multiple sclerosis-like illness and worsening of heart failure. Infusion or injection reactions which can be deadly have been reported. Studies on pregnancy have not been done so pregnancy should be avoided while on these agents. Reactivation of a deadly brain virus and hepatitis viruses have been reported. Worsening of COPD has been seen with orencia. Elevated lipids, elevation in liver functions, and dangerous changes in blood counts have been seen with certain agents. Regular monitoring will be required.  CATE positive  Labs 2/7/24: Normal CBC, CMP normal except for mild LFT elevation  Labs 5/3/23: Positive CATE direct, negative double-stranded DNA, negative rheumatoid factor, normal TSH and normal thyroid autoantibodies.    Suspect related to underlying psoriatic arthritis/psoriasis.  She has no clinical features of lupus or connective tissue disease    No Raynaud's, no malar rash, no oral nasal ulcers, no pleurisy/pericarditis, no seizure disorder, no renal or hematologic abnormality.  No clotting disorder.  No photosensitivity    An CATE test is a non specific test. While it certainly can be positive in conditions like SLE, scleroderma, myositis, Sjögren's, RA , vasculitis etc.. It can also be positive in  patients with thyroid disease, type 1 diabetes, psoriasis, Celiac disease, inflammatory bowel disease, COPD/chronic lung disease, cancers, etc.. There are also reports of normal/apparently healthy individuals who are incidentally found to have a +CATE test. You can also frequently get a false positive CATE test.  Positive CATE results increase with age.  15% of patients over 65 years of age are CATE positive, along with 5% of the general population.  handout on positive CATE test given.      Generalized osteoarthrosis, involving multiple sites  Orthopedics in [place] Dr. Yvan Cunningham, [facility]  Podiatry Dr Gladis Christianson.    Using p.r.n. NSAID per Orthopedics for chronic back pain likely from degenerative arthritis.    Follow-up orthopedics for right hip pain likely from osteoarthritis.  She had x-ray right hip through [facility] orth[facility]  Recommend PPI for gastric protection  NSAID long-term use  Risks of NSAIDs discussed including GI upset, GI bleeding, renal and hepatic risks and the risks of cardiovascular disease and stroke. Warned patient not to take with other NSAIDs including OTC NSAIDs.    Orders Placed This Encounter   Procedures    Comprehensive Metabolic Panel    CBC Auto Differential    C-reactive Protein    Sedimentation Rate     New Medications Ordered This Visit   Medications    methotrexate 2.5 MG tablet     Sig: Take 6 tablets by mouth 1 (One) Time Per Week.     Dispense:  72 tablet     Refill:  0    folic acid (FOLVITE) 1 MG tablet     Sig: Take 1 tablet by mouth Daily.     Dispense:  90 tablet     Refill:  3           Follow Up:   Return if symptoms worsen or fail to improve.        Mario Alberto Ramos MD  [facility]

## 2024-06-24 NOTE — PATIENT INSTRUCTIONS
Methotrexate Tablets    What is this medication?  METHOTREXATE (METH oh TREX ate) treats autoimmune conditions, such as arthritis and psoriasis. It works by decreasing inflammation, which can reduce pain and prevent long-term injury to the joints and skin. It may also be used to treat some types of cancer. It works by slowing down the growth of cancer cells.  This medicine may be used for other purposes; ask your health care provider or pharmacist if you have questions.  COMMON BRAND NAME(S): Rheumatrex, Trexall    What should I tell my care team before I take this medication?  They need to know if you have any of these conditions:  Dehydration  Diabetes  Fluid in the stomach area or lungs  Frequently drink alcohol  Having surgery, including dental surgery  High cholesterol  Immune system problems  Inflammatory bowel disease, such as ulcerative colitis  Kidney disease  Liver disease  Low blood cell levels (white cells, red cells, and platelets)  Lung disease  Recent or ongoing radiation  Recent or upcoming vaccine  Stomach ulcers, other stomach or intestine problems  An unusual or allergic reaction to methotrexate, other medications, foods, dyes, or preservatives  Pregnant or trying to get pregnant  Breastfeeding    How should I use this medication?  Take this medication by mouth with water. Take it as directed on the prescription label. Do not take extra. Keep taking this medication until your care team tells you to stop.  Know why you are taking this medication and how you should take it. To treat conditions such as arthritis and psoriasis, this medication is taken ONCE A WEEK as a single dose or divided into 3 smaller doses taken 12 hours apart (do not take more than 3 doses 12 hours apart each week). This medication is NEVER taken daily to treat conditions other than cancer. Taking this medication more often than directed can cause serious side effects, even death. Talk to your care team about why you are taking  "this medication, how often you will take it, and what your dose is. Ask your care team to put the reason you take this medication on the prescription.  If you take this medication ONCE A WEEK, choose a day of the week before you start. Ask your pharmacist to include the day of the week on the label. Avoid \"Monday\", which could be misread as \"Morning\".  Handling this medication may be harmful. Talk to your care team about how to handle this medication. Special instructions may apply.  Talk to your care team about the use of this medication in children. While it may be prescribed for selected conditions, precautions do apply.  Overdosage: If you think you have taken too much of this medicine contact a poison control center or emergency room at once.  NOTE: This medicine is only for you. Do not share this medicine with others.    What if I miss a dose?  If you miss a dose, talk with your care team. Do not take double or extra doses.    What may interact with this medication?  Do not take this medication with any of the following:  Acitretin  Live virus vaccines  Probenecid  This medication may also interact with the following:  Alcohol  Aspirin and aspirin-like medications  Certain antibiotics, such as penicillin, neomycin, sulfamethoxazole; trimethoprim  Certain medications for stomach problems, such as lansoprazole, omeprazole, pantoprazole  Clozapine  Cyclosporine  Dapsone  Folic acid  Foscarnet  NSAIDs, medications for pain and inflammation, such as ibuprofen or naproxen  Phenytoin  Pyrimethamine  Steroid medications, such as prednisone or cortisone  Tacrolimus  Theophylline  This list may not describe all possible interactions. Give your health care provider a list of all the medicines, herbs, non-prescription drugs, or dietary supplements you use. Also tell them if you smoke, drink alcohol, or use illegal drugs. Some items may interact with your medicine.    What should I watch for while using this " medication?  Visit your care team for regular checks on your progress. It may be some time before you see the benefit from this medication.  You may need blood work done while you are taking this medication.  If your care team has also prescribed folic acid, they may instruct you to skip your folic acid dose on the day you take methotrexate.  This medication can make you more sensitive to the sun. Keep out of the sun. If you cannot avoid being in the sun, wear protective clothing and sunscreen. Do not use sun lamps, tanning beds, or tanning booths.  Check with your care team if you have severe diarrhea, nausea, and vomiting, or if you sweat a lot. The loss of too much body fluid may make it dangerous for you to take this medication.  This medication may increase your risk of getting an infection. Call your care team for advice if you get a fever, chills, sore throat, or other symptoms of a cold or flu. Do not treat yourself. Try to avoid being around people who are sick.  Talk to your care team about your risk of cancer. You may be more at risk for certain types of cancers if you take this medication.  Talk to your care team if you or your partner may be pregnant. Serious birth defects can occur if you take this medication during pregnancy and for 6 months after the last dose. You will need a negative pregnancy test before starting this medication. Contraception is recommended while taking this medication and for 6 months after the last dose. Your care team can help you find the option that works for you.  If your partner can get pregnant, use a condom during sex while taking this medication and for 3 months after the last dose.  Do not breastfeed while taking this medication and for 1 week after the last dose.  This medication may cause infertility. Talk to your care team if you are concerned about your fertility.    What side effects may I notice from receiving this medication?  Side effects that you should report  to your care team as soon as possible:  Allergic reactions--skin rash, itching, hives, swelling of the face, lips, tongue, or throat  Dry cough, shortness of breath or trouble breathing  Infection--fever, chills, cough, sore throat, wounds that don't heal, pain or trouble when passing urine, general feeling of discomfort or being unwell  Kidney injury--decrease in the amount of urine, swelling of the ankles, hands, or feet  Liver injury--right upper belly pain, loss of appetite, nausea, light-colored stool, dark yellow or brown urine, yellowing skin or eyes, unusual weakness or fatigue  Low red blood cell level--unusual weakness or fatigue, dizziness, headache, trouble breathing  Pain, tingling, or numbness in the hands or feet, muscle weakness, change in vision, confusion or trouble speaking, loss of balance or coordination, trouble walking, seizures  Redness, blistering, peeling, or loosening of the skin, including inside the mouth  Stomach bleeding--bloody or black, tar-like stools, vomiting blood or brown material that looks like coffee grounds  Stomach pain that is severe, does not away, or gets worse  Unusual bruising or bleeding  Side effects that usually do not require medical attention (report these to your care team if they continue or are bothersome):  Diarrhea  Dizziness  Hair loss  Nausea  Pain, redness, or swelling with sores inside the mouth or throat  Skin reactions on sun-exposed areas  Vomiting  This list may not describe all possible side effects. Call your doctor for medical advice about side effects. You may report side effects to FDA at 3-108-FDA-1173.    Where should I keep my medication?  Keep out of the reach of children and pets.  Store at room temperature between 20 and 25 degrees C (68 and 77 degrees F). Protect from light. Keep the container tightly closed. Get rid of any unused medication after the expiration date.  To get rid of medications that are no longer needed or have  :  Take the medication to a medication take-back program. Check with your pharmacy or law enforcement to find a location.  If you cannot return the medication, ask your pharmacist or care team how to get rid of this medication safely.  NOTE: This sheet is a summary. It may not cover all possible information. If you have questions about this medicine, talk to your doctor, pharmacist, or health care provider.  ©  [employer] (2024 00:00:00) Psoriatic Arthritis    Psoriatic arthritis, or PsA, is a long-term (chronic) condition that causes pain, swelling, and stiffness in the joints. The large joints of the legs, hips, and pelvis are most often affected but it can affect any joint in your body.    Most people with PsA have a chronic skin disease that causes itchy scales and patches to form on the skin (psoriasis) before they develop PsA. In some cases, a person may have PsA before or without having psoriasis.    PsA can be mild or severe. If untreated, PsA may cause joint damage.    What are the causes?    The exact cause of this condition is not known. PsA is an autoimmune disease. With this type of disease, the body's defense system (immune system) mistakenly attacks joints and the tissues that connect muscles to joints (tendons).    The disease may be activated by a trigger, such as:  Infections.  Stress.  Alcohol.    What increases the risk?    You are more likely to develop this condition if:  You have psoriasis.  You have a family history of psoriasis or PsA.  You are between the ages of 30 and 50.    What are the signs or symptoms?    The main symptom of this condition is inflammation of joints and tendons. Other symptoms may include:  Joint pain or swelling.  Joint stiffness, especially in the morning.  Swollen fingers and toes.  Pain in areas where tendons connect to bones.  Pitted and weak nails.  Tiredness (fatigue).  Eye redness.    Symptoms of this condition vary from person to  person. Symptoms may come and go. Sometimes, symptoms get worse for a period of time. This is called a flare.    How is this diagnosed?    This condition may be diagnosed based on:  Your symptoms and medical history.  A physical exam.  Imaging tests such as an X-ray, a CT scan, or an MRI.  Blood tests to look for inflammation and to rule out other causes, such as gout or rheumatoid arthritis.    You may also be referred to a health care provider who specializes in treating this condition (rheumatologist).    How is this treated?    The goal of treatment is to reduce pain and inflammation and to protect joints from damage. Treatment depends on how severe the inflammation is and how many joints are affected. Treatment may include:  Medicines.  NSAIDs, such as ibuprofen or naproxen.  Steroids. These can be taken by mouth or injected into a swollen joint.  Disease-modifying anti-rheumatic drugs (DMARDs). These slow the course of the disease.  Biologic medicines. These medicines are usually given as injections or through an IV. They can be very effective, but these medicines can increase the risk of developing infections.  Physical therapy and other exercises to:  Strengthen muscles that support joints.  Prevent joint stiffness.  Lifestyle changes. It is important to rest as needed, eat a healthy diet, and exercise.  A brace or splint to support a painful and swollen joint.  Surgery if you have severe joint damage.  Management of any associated medical conditions. Inflammation from PsA can affect other parts of the body, including the heart, eyes, or kidneys.    Follow these instructions at home:    Managing pain, stiffness, and swelling    If directed, put ice on painful areas. To do this:  If you have a removable brace or splint, remove it as told by your health care provider.  Put ice in a plastic bag.  Place a towel between your skin and the bag.  Leave the ice on for 20 minutes, 2-3 times a day.  Remove the ice if  your skin turns bright red. This is very important. If you cannot feel pain, heat, or cold, you have a greater risk of damage to the area.    If you have a removable brace or splint:  Wear the brace or splint as told by your health care provider. Remove it only as told by your health care provider.  Check the skin around the brace or splint every day. Tell your health care provider about any concerns.  Loosen the brace or splint if your fingers or toes tingle, become numb, or turn cold and blue.  Keep the brace or splint clean and dry.    Activity  Return to your normal activities as told by your health care provider. Ask your health care provider what activities are safe for you.  Get regular exercise. Ask your health care provider what type of exercise is best for you. Your health care provider may recommend:  Low-impact exercises such as walking, biking, or swimming.  Exercises that include stretching, such as silvio chi and yoga.  Do not exercise when you have a flare of symptoms. Rest until the symptoms improve.    Lifestyle    Maintain a healthy weight. A healthy weight will help you stay active and take stress off your joints.  Eat a healthy diet that includes plenty of vegetables, fruits, whole grains, low-fat dairy products, and lean protein. Do not eat a lot of foods that are high in solid fats, added sugars, or salt.  Use techniques for stress reduction, such as meditation or yoga.  Do not use any products that contain nicotine or tobacco. These products include cigarettes, chewing tobacco, and vaping devices, such as e-cigarettes. If you need help quitting, ask your health care provider.  Consider joining a PsA support group.    General instructions  Take over-the-counter and prescription medicines only as told by your health care provider.  Keep a journal to help track your symptoms. Try to avoid any triggers.  Do not drink alcohol if your health care provider tells you not to drink.  See a mental health  therapist if you feel sad, anxious, frustrated, and hopeless. Managing this condition can be challenging and you may feel overwhelmed and depressed.  Keep all follow-up visits. Your health care provider may monitor your condition over time to make sure that it does not cause problems or get worse.    Where to find support  National Psoriasis Foundation: www.psoriasis.org    Where to find more information  American Academy of Dermatology: www.aad.org  American College of Rheumatology: www.rheumatology.org    Contact a health care provider if:  You have a fever or chills.  Your symptoms get worse.  You feel depressed, frustrated, and hopeless about your condition.    Get help right away if:  You have thoughts of hurting yourself or others.  Get help right away if you feel like you may hurt yourself or others, or have thoughts about taking your own life. Go to your nearest emergency room or:  Call 911.  Call the [employer] Suicide Prevention Lifeline at 1-370.894.2067 or 344. This is open 24 hours a day.  Text the Crisis Text Line at 029772.    Summary  Psoriatic arthritis, or PsA, is a long-term condition that causes pain, swelling, and stiffness in the joints.  The goal of treatment is to reduce pain and inflammation and to protect joints from damage.  Treatment depends on how severe the inflammation is and how many joints are affected.    This information is not intended to replace advice given to you by your health care provider. Make sure you discuss any questions you have with your health care provider.  Document Revised: 02/06/2023 Document Reviewed: 02/06/2023  [employer] Patient Education © 2024 [employer] Methotrexate Tablets    What is this medication?  METHOTREXATE (METH oh TREX ate) treats autoimmune conditions, such as arthritis and psoriasis. It works by decreasing inflammation, which can reduce pain and prevent long-term injury to the joints and skin. It may also be used to treat some types of cancer. It  works by slowing down the growth of cancer cells.  This medicine may be used for other purposes; ask your health care provider or pharmacist if you have questions.  COMMON BRAND NAME(S): Rheumatrex, Trexall    What should I tell my care team before I take this medication?  They need to know if you have any of these conditions:  Dehydration  Diabetes  Fluid in the stomach area or lungs  Frequently drink alcohol  Having surgery, including dental surgery  High cholesterol  Immune system problems  Inflammatory bowel disease, such as ulcerative colitis  Kidney disease  Liver disease  Low blood cell levels (white cells, red cells, and platelets)  Lung disease  Recent or ongoing radiation  Recent or upcoming vaccine  Stomach ulcers, other stomach or intestine problems  An unusual or allergic reaction to methotrexate, other medications, foods, dyes, or preservatives  Pregnant or trying to get pregnant  Breastfeeding    How should I use this medication?  Take this medication by mouth with water. Take it as directed on the prescription label. Do not take extra. Keep taking this medication until your care team tells you to stop.  Know why you are taking this medication and how you should take it. To treat conditions such as arthritis and psoriasis, this medication is taken ONCE A WEEK as a single dose or divided into 3 smaller doses taken 12 hours apart (do not take more than 3 doses 12 hours apart each week). This medication is NEVER taken daily to treat conditions other than cancer. Taking this medication more often than directed can cause serious side effects, even death. Talk to your care team about why you are taking this medication, how often you will take it, and what your dose is. Ask your care team to put the reason you take this medication on the prescription.  If you take this medication ONCE A WEEK, choose a day of the week before you start. Ask your pharmacist to include the day of the week on the label. Avoid  "\"Monday\", which could be misread as \"Morning\".  Handling this medication may be harmful. Talk to your care team about how to handle this medication. Special instructions may apply.  Talk to your care team about the use of this medication in children. While it may be prescribed for selected conditions, precautions do apply.  Overdosage: If you think you have taken too much of this medicine contact a poison control center or emergency room at once.  NOTE: This medicine is only for you. Do not share this medicine with others.    What if I miss a dose?  If you miss a dose, talk with your care team. Do not take double or extra doses.    What may interact with this medication?  Do not take this medication with any of the following:  Acitretin  Live virus vaccines  Probenecid  This medication may also interact with the following:  Alcohol  Aspirin and aspirin-like medications  Certain antibiotics, such as penicillin, neomycin, sulfamethoxazole; trimethoprim  Certain medications for stomach problems, such as lansoprazole, omeprazole, pantoprazole  Clozapine  Cyclosporine  Dapsone  Folic acid  Foscarnet  NSAIDs, medications for pain and inflammation, such as ibuprofen or naproxen  Phenytoin  Pyrimethamine  Steroid medications, such as prednisone or cortisone  Tacrolimus  Theophylline  This list may not describe all possible interactions. Give your health care provider a list of all the medicines, herbs, non-prescription drugs, or dietary supplements you use. Also tell them if you smoke, drink alcohol, or use illegal drugs. Some items may interact with your medicine.    What should I watch for while using this medication?  Visit your care team for regular checks on your progress. It may be some time before you see the benefit from this medication.  You may need blood work done while you are taking this medication.  If your care team has also prescribed folic acid, they may instruct you to skip your folic acid dose on the day " you take methotrexate.  This medication can make you more sensitive to the sun. Keep out of the sun. If you cannot avoid being in the sun, wear protective clothing and sunscreen. Do not use sun lamps, tanning beds, or tanning booths.  Check with your care team if you have severe diarrhea, nausea, and vomiting, or if you sweat a lot. The loss of too much body fluid may make it dangerous for you to take this medication.  This medication may increase your risk of getting an infection. Call your care team for advice if you get a fever, chills, sore throat, or other symptoms of a cold or flu. Do not treat yourself. Try to avoid being around people who are sick.  Talk to your care team about your risk of cancer. You may be more at risk for certain types of cancers if you take this medication.  Talk to your care team if you or your partner may be pregnant. Serious birth defects can occur if you take this medication during pregnancy and for 6 months after the last dose. You will need a negative pregnancy test before starting this medication. Contraception is recommended while taking this medication and for 6 months after the last dose. Your care team can help you find the option that works for you.  If your partner can get pregnant, use a condom during sex while taking this medication and for 3 months after the last dose.  Do not breastfeed while taking this medication and for 1 week after the last dose.  This medication may cause infertility. Talk to your care team if you are concerned about your fertility.    What side effects may I notice from receiving this medication?  Side effects that you should report to your care team as soon as possible:  Allergic reactions--skin rash, itching, hives, swelling of the face, lips, tongue, or throat  Dry cough, shortness of breath or trouble breathing  Infection--fever, chills, cough, sore throat, wounds that don't heal, pain or trouble when passing urine, general feeling of  discomfort or being unwell  Kidney injury--decrease in the amount of urine, swelling of the ankles, hands, or feet  Liver injury--right upper belly pain, loss of appetite, nausea, light-colored stool, dark yellow or brown urine, yellowing skin or eyes, unusual weakness or fatigue  Low red blood cell level--unusual weakness or fatigue, dizziness, headache, trouble breathing  Pain, tingling, or numbness in the hands or feet, muscle weakness, change in vision, confusion or trouble speaking, loss of balance or coordination, trouble walking, seizures  Redness, blistering, peeling, or loosening of the skin, including inside the mouth  Stomach bleeding--bloody or black, tar-like stools, vomiting blood or brown material that looks like coffee grounds  Stomach pain that is severe, does not away, or gets worse  Unusual bruising or bleeding  Side effects that usually do not require medical attention (report these to your care team if they continue or are bothersome):  Diarrhea  Dizziness  Hair loss  Nausea  Pain, redness, or swelling with sores inside the mouth or throat  Skin reactions on sun-exposed areas  Vomiting  This list may not describe all possible side effects. Call your doctor for medical advice about side effects. You may report side effects to FDA at 5-633-FDA-2683.    Where should I keep my medication?  Keep out of the reach of children and pets.  Store at room temperature between 20 and 25 degrees C (68 and 77 degrees F). Protect from light. Keep the container tightly closed. Get rid of any unused medication after the expiration date.  To get rid of medications that are no longer needed or have :  Take the medication to a medication take-back program. Check with your pharmacy or law enforcement to find a location.  If you cannot return the medication, ask your pharmacist or care team how to get rid of this medication safely.  NOTE: This sheet is a summary. It may not cover all possible information. If you  have questions about this medicine, talk to your doctor, pharmacist, or health care provider.  © 2024 [employer] (2024-02-25 00:00:00)

## 2024-06-24 NOTE — ASSESSMENT & PLAN NOTE
We discussed biologic agents at length. Risks and alternatives were discussed at length and the option of no treatment was also given. We discussed risks including but not limited to infections which can be unusual, severe, and deadly. When possible, these agents should be stopped immediately if infections occur. Unusual infection such as TB and fungal infections can occur. There may be an increased risk of lymphoma with these agents. Other risks can include a multiple sclerosis-like illness and worsening of heart failure. Infusion or injection reactions which can be deadly have been reported. Studies on pregnancy have not been done so pregnancy should be avoided while on these agents. Reactivation of a deadly brain virus and hepatitis viruses have been reported. Worsening of COPD has been seen with orencia. Elevated lipids, elevation in liver functions, and dangerous changes in blood counts have been seen with certain agents. Regular monitoring will be required.

## 2024-06-24 NOTE — ASSESSMENT & PLAN NOTE
Risks of NSAIDs discussed including GI upset, GI bleeding, renal and hepatic risks and the risks of cardiovascular disease and stroke. Warned patient not to take with other NSAIDs including OTC NSAIDs.

## 2024-06-24 NOTE — ASSESSMENT & PLAN NOTE
Orthopedics in [place] Dr. Yvan Cunningham, [facility]  Podiatry Dr Gladis Christianson.    Using p.r.n. NSAID per Orthopedics for chronic back pain likely from degenerative arthritis.    Follow-up orthopedics for right hip pain likely from osteoarthritis.  She had x-ray right hip through [facility] orth[facility]  Recommend PPI for gastric protection

## 2024-06-24 NOTE — ASSESSMENT & PLAN NOTE
+ psoriasis scalp, +CATE, negative rheumatoid factor  Right hand MCP, 3rd finger pain swelling since 2023  *Current: Tildrakizumab since 2023 (Dr Infante dermatology), MTX 4/24(ACL), *diclofenac (dr Cunningham)  Prior Humira, Otezla, Cosentyx, Skyrizi per dermatologist.    Patient with known psoriasis scalp.  Rheumatoid factor negative.  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  She developed right hand pain and swelling in 2023 consistent with psoriatic arthritis    Low disease activity.  No swollen inflamed peripheral joints.  I believe her right hip pain is likely coming from osteoarthritis  Improved with resolution of right hand pain and swelling since addition of methotrexate 4/24  -Continue methotrexate 6 tablets once weekly.  Well-tolerated and effective  Continues on biologic therapy for psoriasis per Dermatology with Tildrakizumab since 2023 which has been highly effective for her scalp psoriasis    Recent labs reviewed 5/24 and are stable.    Standing order provided for labs CBC CMP sed rate CRP every 12 weeks on methotrexate next due 8/15/2024  Handout provided today on methotrexate and psoriatic arthritis.  Recommend she follow-up with orthopedics for right hip pain likely from osteoarthritis.  She was told by [facility] orthopedics that she will likely need a right hip replacement if steroid injection right hip did not help  Continue diclofenac up to twice daily with PPI protection  She is moving to [place] in July 2024 and plans to establish with an orthopedist, dermatologist, rheumatologist there.  Return to clinic as needed if she is back in [place]